# Patient Record
Sex: FEMALE | Race: WHITE | NOT HISPANIC OR LATINO | URBAN - METROPOLITAN AREA
[De-identification: names, ages, dates, MRNs, and addresses within clinical notes are randomized per-mention and may not be internally consistent; named-entity substitution may affect disease eponyms.]

---

## 2023-07-22 ENCOUNTER — INPATIENT (INPATIENT)
Facility: HOSPITAL | Age: 18
LOS: 3 days | Discharge: ANOTHER HOSPITAL | DRG: 917 | End: 2023-07-26
Attending: STUDENT IN AN ORGANIZED HEALTH CARE EDUCATION/TRAINING PROGRAM | Admitting: STUDENT IN AN ORGANIZED HEALTH CARE EDUCATION/TRAINING PROGRAM
Payer: MEDICAID

## 2023-07-22 VITALS
DIASTOLIC BLOOD PRESSURE: 80 MMHG | RESPIRATION RATE: 21 BRPM | HEIGHT: 64 IN | TEMPERATURE: 98 F | OXYGEN SATURATION: 96 % | SYSTOLIC BLOOD PRESSURE: 123 MMHG | HEART RATE: 157 BPM | WEIGHT: 160.06 LBS

## 2023-07-22 DIAGNOSIS — T45.0X2A POISONING BY ANTIALLERGIC AND ANTIEMETIC DRUGS, INTENTIONAL SELF-HARM, INITIAL ENCOUNTER: ICD-10-CM

## 2023-07-22 LAB
ALBUMIN SERPL ELPH-MCNC: 4.4 G/DL — SIGNIFICANT CHANGE UP (ref 3.5–5)
ALP SERPL-CCNC: 72 U/L — SIGNIFICANT CHANGE UP (ref 40–120)
ALT FLD-CCNC: 19 U/L DA — SIGNIFICANT CHANGE UP (ref 10–60)
AMPHET UR-MCNC: NEGATIVE — SIGNIFICANT CHANGE UP
ANION GAP SERPL CALC-SCNC: 13 MMOL/L — SIGNIFICANT CHANGE UP (ref 5–17)
APAP SERPL-MCNC: <2 UG/ML — LOW (ref 10–30)
APPEARANCE UR: CLEAR — SIGNIFICANT CHANGE UP
AST SERPL-CCNC: 16 U/L — SIGNIFICANT CHANGE UP (ref 10–40)
BACTERIA # UR AUTO: ABNORMAL /HPF
BARBITURATES UR SCN-MCNC: NEGATIVE — SIGNIFICANT CHANGE UP
BASOPHILS # BLD AUTO: 0.03 K/UL — SIGNIFICANT CHANGE UP (ref 0–0.2)
BASOPHILS NFR BLD AUTO: 0.3 % — SIGNIFICANT CHANGE UP (ref 0–2)
BENZODIAZ UR-MCNC: NEGATIVE — SIGNIFICANT CHANGE UP
BILIRUB SERPL-MCNC: 0.7 MG/DL — SIGNIFICANT CHANGE UP (ref 0.2–1.2)
BILIRUB UR-MCNC: NEGATIVE — SIGNIFICANT CHANGE UP
BUN SERPL-MCNC: 10 MG/DL — SIGNIFICANT CHANGE UP (ref 7–18)
CALCIUM SERPL-MCNC: 8.8 MG/DL — SIGNIFICANT CHANGE UP (ref 8.4–10.5)
CHLORIDE SERPL-SCNC: 105 MMOL/L — SIGNIFICANT CHANGE UP (ref 96–108)
CO2 SERPL-SCNC: 21 MMOL/L — LOW (ref 22–31)
COCAINE METAB.OTHER UR-MCNC: NEGATIVE — SIGNIFICANT CHANGE UP
COLOR SPEC: YELLOW — SIGNIFICANT CHANGE UP
CREAT SERPL-MCNC: 0.62 MG/DL — SIGNIFICANT CHANGE UP (ref 0.5–1.3)
DIFF PNL FLD: ABNORMAL
EGFR: 131 ML/MIN/1.73M2 — SIGNIFICANT CHANGE UP
EOSINOPHIL # BLD AUTO: 0.02 K/UL — SIGNIFICANT CHANGE UP (ref 0–0.5)
EOSINOPHIL NFR BLD AUTO: 0.2 % — SIGNIFICANT CHANGE UP (ref 0–6)
EPI CELLS # UR: ABNORMAL /HPF
ETHANOL SERPL-MCNC: <3 MG/DL — SIGNIFICANT CHANGE UP (ref 0–10)
GLUCOSE SERPL-MCNC: 99 MG/DL — SIGNIFICANT CHANGE UP (ref 70–99)
GLUCOSE UR QL: NEGATIVE — SIGNIFICANT CHANGE UP
HCG SERPL-ACNC: <1 MIU/ML — SIGNIFICANT CHANGE UP
HCT VFR BLD CALC: 37.1 % — SIGNIFICANT CHANGE UP (ref 34.5–45)
HGB BLD-MCNC: 12.6 G/DL — SIGNIFICANT CHANGE UP (ref 11.5–15.5)
IMM GRANULOCYTES NFR BLD AUTO: 0.2 % — SIGNIFICANT CHANGE UP (ref 0–0.9)
KETONES UR-MCNC: ABNORMAL
LEUKOCYTE ESTERASE UR-ACNC: NEGATIVE — SIGNIFICANT CHANGE UP
LYMPHOCYTES # BLD AUTO: 2.17 K/UL — SIGNIFICANT CHANGE UP (ref 1–3.3)
LYMPHOCYTES # BLD AUTO: 24.1 % — SIGNIFICANT CHANGE UP (ref 13–44)
MCHC RBC-ENTMCNC: 32.1 PG — SIGNIFICANT CHANGE UP (ref 27–34)
MCHC RBC-ENTMCNC: 34 GM/DL — SIGNIFICANT CHANGE UP (ref 32–36)
MCV RBC AUTO: 94.4 FL — SIGNIFICANT CHANGE UP (ref 80–100)
METHADONE UR-MCNC: NEGATIVE — SIGNIFICANT CHANGE UP
MONOCYTES # BLD AUTO: 0.58 K/UL — SIGNIFICANT CHANGE UP (ref 0–0.9)
MONOCYTES NFR BLD AUTO: 6.4 % — SIGNIFICANT CHANGE UP (ref 2–14)
NEUTROPHILS # BLD AUTO: 6.2 K/UL — SIGNIFICANT CHANGE UP (ref 1.8–7.4)
NEUTROPHILS NFR BLD AUTO: 68.8 % — SIGNIFICANT CHANGE UP (ref 43–77)
NITRITE UR-MCNC: NEGATIVE — SIGNIFICANT CHANGE UP
NRBC # BLD: 0 /100 WBCS — SIGNIFICANT CHANGE UP (ref 0–0)
OPIATES UR-MCNC: NEGATIVE — SIGNIFICANT CHANGE UP
PCP SPEC-MCNC: SIGNIFICANT CHANGE UP
PCP UR-MCNC: NEGATIVE — SIGNIFICANT CHANGE UP
PH UR: 6.5 — SIGNIFICANT CHANGE UP (ref 5–8)
PLATELET # BLD AUTO: 204 K/UL — SIGNIFICANT CHANGE UP (ref 150–400)
POTASSIUM SERPL-MCNC: 2.7 MMOL/L — CRITICAL LOW (ref 3.5–5.3)
POTASSIUM SERPL-SCNC: 2.7 MMOL/L — CRITICAL LOW (ref 3.5–5.3)
PROT SERPL-MCNC: 7.8 G/DL — SIGNIFICANT CHANGE UP (ref 6–8.3)
PROT UR-MCNC: 15 MG/DL
RBC # BLD: 3.93 M/UL — SIGNIFICANT CHANGE UP (ref 3.8–5.2)
RBC # FLD: 11.5 % — SIGNIFICANT CHANGE UP (ref 10.3–14.5)
RBC CASTS # UR COMP ASSIST: ABNORMAL /HPF (ref 0–2)
SALICYLATES SERPL-MCNC: <1.7 MG/DL — LOW (ref 2.8–20)
SODIUM SERPL-SCNC: 139 MMOL/L — SIGNIFICANT CHANGE UP (ref 135–145)
SP GR SPEC: 1.01 — SIGNIFICANT CHANGE UP (ref 1.01–1.02)
THC UR QL: NEGATIVE — SIGNIFICANT CHANGE UP
TSH SERPL-MCNC: 3.32 UU/ML — SIGNIFICANT CHANGE UP (ref 0.34–4.82)
UROBILINOGEN FLD QL: NEGATIVE — SIGNIFICANT CHANGE UP
WBC # BLD: 9.02 K/UL — SIGNIFICANT CHANGE UP (ref 3.8–10.5)
WBC # FLD AUTO: 9.02 K/UL — SIGNIFICANT CHANGE UP (ref 3.8–10.5)
WBC UR QL: SIGNIFICANT CHANGE UP /HPF (ref 0–5)

## 2023-07-22 PROCEDURE — 74018 RADEX ABDOMEN 1 VIEW: CPT | Mod: 26

## 2023-07-22 PROCEDURE — 99285 EMERGENCY DEPT VISIT HI MDM: CPT | Mod: 25

## 2023-07-22 PROCEDURE — 43753 TX GASTRO INTUB W/ASP: CPT

## 2023-07-22 PROCEDURE — 31500 INSERT EMERGENCY AIRWAY: CPT

## 2023-07-22 PROCEDURE — 71045 X-RAY EXAM CHEST 1 VIEW: CPT | Mod: 26

## 2023-07-22 PROCEDURE — 93010 ELECTROCARDIOGRAM REPORT: CPT

## 2023-07-22 RX ORDER — ENOXAPARIN SODIUM 100 MG/ML
40 INJECTION SUBCUTANEOUS EVERY 24 HOURS
Refills: 0 | Status: DISCONTINUED | OUTPATIENT
Start: 2023-07-22 | End: 2023-07-26

## 2023-07-22 RX ORDER — ACTIVATED CHARCOAL 25 G/120ML
100 SUSPENSION, ORAL (FINAL DOSE FORM) ORAL ONCE
Refills: 0 | Status: COMPLETED | OUTPATIENT
Start: 2023-07-22 | End: 2023-07-22

## 2023-07-22 RX ORDER — SODIUM CHLORIDE 9 MG/ML
1000 INJECTION INTRAMUSCULAR; INTRAVENOUS; SUBCUTANEOUS ONCE
Refills: 0 | Status: COMPLETED | OUTPATIENT
Start: 2023-07-22 | End: 2023-07-22

## 2023-07-22 RX ORDER — PHYSOSTIGMINE SALICYLATE 1 MG/ML
1 AMPUL (ML) INJECTION ONCE
Refills: 0 | Status: DISCONTINUED | OUTPATIENT
Start: 2023-07-22 | End: 2023-07-22

## 2023-07-22 RX ORDER — CHLORHEXIDINE GLUCONATE 213 G/1000ML
1 SOLUTION TOPICAL
Refills: 0 | Status: DISCONTINUED | OUTPATIENT
Start: 2023-07-22 | End: 2023-07-23

## 2023-07-22 RX ORDER — PROPOFOL 10 MG/ML
10 INJECTION, EMULSION INTRAVENOUS
Qty: 1000 | Refills: 0 | Status: DISCONTINUED | OUTPATIENT
Start: 2023-07-22 | End: 2023-07-23

## 2023-07-22 RX ORDER — PHYSOSTIGMINE SALICYLATE 1 MG/ML
2 AMPUL (ML) INJECTION ONCE
Refills: 0 | Status: DISCONTINUED | OUTPATIENT
Start: 2023-07-22 | End: 2023-07-22

## 2023-07-22 RX ADMIN — SODIUM CHLORIDE 1000 MILLILITER(S): 9 INJECTION INTRAMUSCULAR; INTRAVENOUS; SUBCUTANEOUS at 21:36

## 2023-07-22 RX ADMIN — Medication 100 GRAM(S): at 23:29

## 2023-07-22 RX ADMIN — Medication 2 MILLIGRAM(S): at 22:10

## 2023-07-22 RX ADMIN — SODIUM CHLORIDE 1000 MILLILITER(S): 9 INJECTION INTRAMUSCULAR; INTRAVENOUS; SUBCUTANEOUS at 22:40

## 2023-07-22 RX ADMIN — PROPOFOL 4.35 MICROGRAM(S)/KG/MIN: 10 INJECTION, EMULSION INTRAVENOUS at 23:29

## 2023-07-22 RX ADMIN — Medication 1 MILLIGRAM(S): at 22:10

## 2023-07-22 RX ADMIN — SODIUM CHLORIDE 1000 MILLILITER(S): 9 INJECTION INTRAMUSCULAR; INTRAVENOUS; SUBCUTANEOUS at 22:10

## 2023-07-22 RX ADMIN — SODIUM CHLORIDE 1000 MILLILITER(S): 9 INJECTION INTRAMUSCULAR; INTRAVENOUS; SUBCUTANEOUS at 22:00

## 2023-07-22 NOTE — ED PROCEDURE NOTE - CPROC ED GASTRIC INTUB DETAIL1
Patient Education     Rest.  Plenty of fluids to stay well hydrated and urinate more.  Add Vitamin C if you can tolerate it to acidify the urine or drink cranberry juice.  Take the antibiotic as prescribed.  Pyridium for the urgency and burning symptoms.  Any fever, abdominal pain, back pain, vomiting, change or worsening of symptoms or further concerns, return to the [facility] for evaluation.    You may be receiving a survey in the mail following your visit. Please take the time to complete this, as your feedback is very important to us! We strive to make your experience exceptional and your comments help us with that goal. We look forward to hearing from you!    Please call your doctor for follow up of this Urgent Care visit.  If anything changes or worsens or further concerns see your primary provider or return to the Urgent Care or seek care at an ER for further evaluation.   I hope our care team has provided you with excellent care and that you feel better soon.      Sincerely,  Sena Watkins PA-C                Bladder Infection, Female (Adult)    Normally, bacteria do not stay in the urine. But when they do, the urine can become infected. This is called a urinary tract infection (UTI). An infection can occur anywhere in the urinary tract, from the kidney to the bladder and urethra. The most common place for a UTI is in the bladder. This is called a bladder infection. This is one of the most common infections in women. Most bladder infections are easily treated. They are not serious unless the infection spreads up to the kidney.  The phrases \"bladder infection\", \"UTI,\" and \"cystitis,\" are often used to describe the same thing, but they are not always the same. Cystitis is an inflammation of the bladder. The most common cause of cystitis is an infection.  In summary:  · Infections in the urine are called UTIs.  · Cystitis is usually caused by a UTI.  · Not al UTIs and cases of cystitis are bladder  infections.  · Bladder infections are the most common type of cystitis.  Symptoms  The infection causes inflammation in the urethra and bladder, which causes many of the symptoms. The most common symptoms of a bladder infection are:  · Pain or burning when urinating  · Having to urinate more often than usual  · Urgent need to urinate  · Only a small amount of urine comes out  · Blood in urine  · Abdominal discomfort, usually in the lower abdomen, above the pubic bone  · Cloudy, strong, or bad smelling urine  · Urinary retention, being unable to urinate  · Unable to hold urine in (urinary incontinence)  · Fever  · Loss of appetite  · Confusion (in older adults)  Causes  Bladder infections are not contagious. You can't get one from someone else, from a toilet seat, or from sharing a bath.  The most common cause of bladder infections is bacteria from the bowels. The bacteria get onto the skin around the opening of the urethra. From there it can get into the urine and travel up to the bladder, causing inflammation and infection. This usually happens because of:  · Wiping improperly after urinating. Always wipe from front to back.  · Bowel incontinence  · Pregnancy  · Procedures such as having a catheter inserted  · Older age  · Not emptying your bladder (stagnated urine gives bacteria a chance to grow)  · Dehydration  · Constipation  · Sex  · Use of a diaphragm for birth control   Treatment  Bladder infections are diagnosed by a urine test. They are treated with antibiotics and usually clear up quickly without complications. Treatment helps prevent a more serious kidney infection.  Medicines  Medicines can help in the treatment of a bladder infection:  · Take antibiotics until they are used up, even if you feel better. It is important to finish them to make sure the infection has cleared.  · You can use acetaminophen or ibuprofen for pain, fever, or discomfort, unless another medicine was prescribed. You can also  alternate them, or use both together. They work differently and are a different class of medicines, so taking them together is not an overdose. If you have chronic liver or kidney disease, talk with your healthcare provider before using these medicines. Also talk with your provider if you've ever had a stomach ulcer or gastrointestinal bleeding, or are taking blood-thinner medicines.  · If you are given phenazopydridine to reduce burning with urination, it will cause your urine to become a bright orange color. This can stain clothing.  Care and prevention  These self-care steps can help prevent future infections:  · Drink plenty of fluids to prevent dehydration and flush out of the bladder. Do this unless you must restrict fluids for other health reasons, or your doctor told you not to.  · Proper cleaning after going to the bathroom is important. Wipe from front to back after using the toilet to prevent the spread of bacteria.  · Urinate more often. Don't try to hold urine in for a long time.  · Wear loose-fitting clothes and cotton underwear. Avoid tight-fitting pans.  · Improve your diet and prevent constipation. Eat more fresh fruit and vegetables, and fiber, and less junk and fatty foods.  · Avoid sex until your symptoms are gone.  · Avoid caffeine, alcohol, and spicy foods. These can irritate the bladder.  · Urinate right after intercourse to flush out the bladder.  · If you use birth control pills and have frequent bladder infections, discuss it with your doctor.  Follow-up care  Call your healthcare provider if all symptoms are not gone after 3 days of treatment. This is especially important if you have repeat infections.  If a culture was done, you will be told if your treatment needs to be changed. If directed, you can call to find out the results.  If X-rays were done, you will be told if the results will affect your treatment.  Call 911  Call emergency services if any of the following occur:  · Trouble  breathing  · Difficulty arousing or confusion  · Fainting or loss of consciousness  · Rapid heart rate  When to seek medical advice  Call your healthcare provider right away if any of these occur:  · Fever of 100.4ºF (38.0ºC) or higher, or as directed  · Symptoms are not better by the third day of treatment  · Back or belly (abdominal) pain that gets worse  · Repeated vomiting, or unable to keep medicine down  · Weakness or dizziness  · Vaginal discharge  · Pain, redness, or swelling in the outer vaginal area (labia)  © 8752-7682 [employer]. [address] [place] [id number]. All rights reserved. This information is not intended as a substitute for professional medical care. Always follow your healthcare professional's instructions.            The orogastric tube (see size above) was inserted via the anatomic location./Placement was confirmed by aspiration of gastric secretions./Bowel sounds present to 4 quadrants.

## 2023-07-22 NOTE — ED PROVIDER NOTE - CLINICAL SUMMARY MEDICAL DECISION MAKING FREE TEXT BOX
17 yo F with AMS suspected diphenydramine overdose. Plan for intubation, IV ativan, tox consult, gastric lavage, ICU admission

## 2023-07-22 NOTE — H&P ADULT - NSHPPHYSICALEXAM_GEN_ALL_CORE
PHYSICAL EXAM:  GENERAL: Intubated and sedated  HEAD:  Atraumatic, Normocephalic  EYES: EOMI, PERRLA, conjunctiva and sclera clear  NECK: Supple  CHEST/LUNG: Clear to auscultation bilaterally; No wheeze; No crackles; No accessory muscles used  HEART: Regular rate and rhythm; No murmurs; gallops or rubs   ABDOMEN: Soft, Nontender, Nondistended; Bowel sounds present; No guarding  EXTREMITIES:  2+ Peripheral Pulses, No cyanosis or edema  PSYCH: AAOx3  NEUROLOGY: unable to assess   SKIN: No rashes or lesions

## 2023-07-22 NOTE — H&P ADULT - HISTORY OF PRESENT ILLNESS
Patient is a 19 yo female with no PMH presents as per her sister Norma for altered mental status. As per patient's sister, patient was found by her mother around 7-8pm tonight passed put on the couch with a bottle of Diphenhydramine next to her. Mother notified Norma, who later called EMS. Sister reports that patient was normal through out the course of the day. As per sister, the bottle was full (had 600 pills) and then was emptied about  correction, when they found her. Patient was found urinating on herself. Patient's sister denies any prior episodes. Denies any psychiatric conditions or stressors in her life. Denies any head trauma or fall. Patient as per sister, is employed as a Physical therapist assistant, and goes to college. Denies illicit drug, ETOH or tobacco use.

## 2023-07-22 NOTE — ED PROVIDER NOTE - PROGRESS NOTE DETAILS
Pt sister dis Pt sister discovered missing pills later. Pt arrived as AMS from home. Has not signs to suggest trauma on exam. Pt with witnessed seizure by ED provider self resolved w/o meds.

## 2023-07-22 NOTE — H&P ADULT - ASSESSMENT
Assessment:   Patient is a 19 yo female with no PMH presents with altered mental status after ingesting unknown amounts of diphenhydramine. Upon evaluation in ED, patient afebrile, noted to be tachycardic to 157 bpm but hemodynamically stable. Patient noted to have 3 minute generalized tonic- clonic seizure in which she was unable to protect her airway and subsequently intubated and ativan was given. Gastric lavage was done showing pill fragments. Labs significant for no leukocytosis, potassium of 2.7 with normal renal function, LFTs noted to be normal. EKG showing...... CT head. xxxxxx Patient admitted to ICU for Acute Encephalopathy 2/2 Suspected Diphenhydramine overdose.     Plan:  Neuro:   #Acute encephalopathy:  - patient presenting with altered mental status after ingesting unknown amounts of diphenhydramine   - can be 2/2 to drug intoxication vs less likely CVA  - UTox noted to be negative, acetaminophen and salicylates negative   - called toxicology [phone] Case # [id number] for alternatives for physostigmine  (pharmacy does not have it)   - s/p ETT and jimenez in ED   - s/p gastric lavage in ED, retained pill fragments recovered  - s/p Activated charcoal in ED   - continue with propofol   - f/u ekg, cardiac enzymes     # Seizures   - patient noted with 2-3 minute one time episode of seizure, unable to protect airway subsequently intubated   - given total 3mg Ativan in ED  - can be 2/2 drug overdose   - Utox noted to be negative, UA noted to be negative for infection   - pharmacology f/u   - f/u ck, lactate, cardiac enzymes   - f/u blood cultures r/o infectious etiologies   - Neuro checks   - f/u EEG  - Neuro consult in AM     Cardiovascular:  # Hypokalemia   - noted to be 2.7   - can be 2/2 s/p gastric lavage   - KCl 40meq x2via OG tube   - f/u EKG and cardiac enzymes   - repeat CMP     Pulmonary:   - No acute issues     Infectious Diseases:  - No acute issues     Gastrointestinal:  - NPO     Renal:  - No acute issues     Heme/onc:   - No acute issues     Endo:   - No acute issues     Skin/ catheter:   - Jimenez catheter placed in ED  - Two peripheral line IVs in place     Prophylaxis:   - DVT prophylaxis     Dispo: ICU  Assessment:   Patient is a 19 yo female with no PMH presents with altered mental status after ingesting unknown amounts of diphenhydramine. Upon evaluation in ED, patient afebrile, noted to be tachycardic to 157 bpm but hemodynamically stable. Patient noted to have 3 minute generalized tonic- clonic seizure in which she was unable to protect her airway and subsequently intubated and ativan was given. Gastric lavage was done showing pill fragments. Labs significant for no leukocytosis, potassium of 2.7 with normal renal function, LFTs noted to be normal. EKG showing...... CT head. xxxxxx Patient admitted to ICU for Acute Encephalopathy 2/2 Suspected Diphenhydramine overdose.     Plan:  Neuro:   #Acute encephalopathy:  - patient presenting with altered mental status after ingesting unknown amounts of diphenhydramine   - can be 2/2 to drug intoxication vs less likely CVA  - UTox noted to be negative, acetaminophen and salicylates negative   - called toxicology [phone] Case # [id number] for alternatives for physostigmine  (pharmacy does not have it)   - s/p ETT and jimenez in ED   - s/p gastric lavage in ED, retained pill fragments recovered  - s/p Activated charcoal in ED   - continue with propofol   - f/u ekg, cardiac enzymes   - f/u CT head  - f/u CT chest and  abdomen pelvis     # Seizures   - patient noted with 2-3 minute one time episode of seizure, unable to protect airway subsequently intubated   - given total 3mg Ativan in ED  - can be 2/2 drug overdose   - Utox noted to be negative, UA noted to be negative for infection   - pharmacology f/u   - f/u ck, lactate, cardiac enzymes   - f/u blood cultures r/o infectious etiologies   - Neuro checks   - continue with ativan as needed   - f/u EEG  - Neuro consult in AM     Cardiovascular:  # Hypokalemia   - noted to be 2.7   - can be 2/2 s/p gastric lavage   - KCl 40meq x2via OG tube   - f/u EKG and cardiac enzymes   - repeat CMP     Pulmonary:   - No acute issues     Infectious Diseases:  - No acute issues     Gastrointestinal:  - NPO   - f/u ct abdomen and pelvis     Renal:  - No acute issues     Heme/onc:   - No acute issues     Endo:   - No acute issues     Skin/ catheter:   - Jimenez catheter placed in ED  - Two peripheral line IVs in place     Prophylaxis:   - DVT prophylaxis     Dispo: ICU  Assessment:   Patient is a 19 yo female with no PMH presents with altered mental status after ingesting unknown amounts of diphenhydramine. Upon evaluation in ED, patient afebrile, noted to be tachycardic to 157 bpm but hemodynamically stable. Patient noted to have 3 minute generalized tonic- clonic seizure in which she was unable to protect her airway and subsequently intubated and ativan was given. Gastric lavage was done showing pill fragments. Labs significant for no leukocytosis, potassium of 2.7 with normal renal function, LFTs noted to be normal, negative cardiac enzymes. EKG showing sinus tachycardia to 135pm with t wave inversions in the inferior leads (unknown baseline), QRS noted to be 88ms and normal QTC at 405ms.  CT head. xxxxxx Patient admitted to ICU for Acute Encephalopathy 2/2 Suspected Diphenhydramine overdose.     Plan:  Neuro:   #Acute encephalopathy:  - patient presenting with altered mental status after ingesting unknown amounts of diphenhydramine   - can be 2/2 to drug intoxication vs less likely CVA  - UTox noted to be negative, acetaminophen and salicylates negative   - EKG noted Sinus tachyardia to 135bpm with t wave inversions in the inferior leads normal QRS and QT   - called toxicology [phone] Case # [id number] for alternatives for physostigmine  (pharmacy does not have it), recommended to hold any further antidotes for diphyenhydramine toxicity, but recommending supportive therapy and consider rivastigmine patch   - s/p ETT and jimenez in ED   - s/p gastric lavage in ED, retained pill fragments recovered  - s/p Activated charcoal in ED   - continue with propofol   - f/u CT head  - f/u CT chest and  abdomen pelvis     # Seizures   - patient noted with 2-3 minute one time episode of seizure, unable to protect airway subsequently intubated   - given total 3mg Ativan in ED  - can be 2/2 drug overdose   - Utox noted to be negative, UA noted to be negative for infection   - negative cardiac enzymes, lactate noted to be 2.4   - f/u blood cultures r/o infectious etiologies   - Neuro checks   - continue with ativan as needed   - f/u EEG  - Neuro consult in AM     Cardiovascular:  # Hypokalemia   - noted to be 2.7   - can be 2/2 s/p gastric lavage/ GI losses   - KCl riders 10meq x3 with LR fluids   - EKG noted as above with negative cardiac enzymes   - repeat CMP     Pulmonary:   - No acute issues     Infectious Diseases:   # Elevated lactate  - noted to be 2.4   - can be 2/2 seizures   - afebrile with no leukocytosis   - IVF hydration   - trend lactate  - f/u ct abdomen   - f/u blood cultures     Gastrointestinal:  - NPO   - f/u ct abdomen and pelvis     Renal:  - No acute issues     Heme/onc:   - No acute issues     Endo:   - No acute issues     Skin/ catheter:   - Jimenez catheter placed in ED  - Two peripheral line IVs in place     Prophylaxis:   - DVT prophylaxis     Dispo: ICU  Assessment:   Patient is a 19 yo female with no PMH presents with altered mental status after ingesting unknown amounts of diphenhydramine. Upon evaluation in ED, patient afebrile, noted to be tachycardic to 157 bpm but hemodynamically stable. Patient noted to have 3 minute generalized tonic- clonic seizure in which she was unable to protect her airway and subsequently intubated and ativan was given. Gastric lavage was done showing pill fragments. Labs significant for no leukocytosis, potassium of 2.7 with normal renal function, LFTs noted to be normal, negative cardiac enzymes. EKG showing sinus tachycardia to 135pm with t wave inversions in the inferior leads (unknown baseline), QRS noted to be 88ms and normal QTC at 405ms.  CT head pending.  Patient admitted to ICU for Acute Encephalopathy 2/2 Suspected Diphenhydramine overdose.     Plan:  Neuro:   #Acute encephalopathy:  - patient presenting with altered mental status after ingesting unknown amounts of diphenhydramine   - can be 2/2 to drug intoxication vs less likely CVA  - UTox noted to be negative, acetaminophen and salicylates negative   - EKG noted Sinus tachyardia to 135bpm with t wave inversions in the inferior leads normal QRS and QT   - called toxicology [phone] Case # [id number] for alternatives for physostigmine  (pharmacy does not have it), recommended to hold any further antidotes for diphyenhydramine toxicity, but recommending supportive therapy and consider rivastigmine patch   - s/p ETT and jimenez in ED   - s/p gastric lavage in ED, retained pill fragments recovered  - s/p Activated charcoal in ED   - continue with propofol   - f/u CT head  - f/u CT chest and  abdomen pelvis     # Seizures   - patient noted with 2-3 minute one time episode of seizure, unable to protect airway subsequently intubated   - given total 3mg Ativan in ED  - can be 2/2 drug overdose   - Utox noted to be negative, UA noted to be negative for infection   - negative cardiac enzymes, lactate noted to be 2.4   - f/u blood cultures r/o infectious etiologies   - Neuro checks   - continue with ativan as needed   - f/u EEG  - Neuro consult in AM     Cardiovascular:  # Hypokalemia   - noted to be 2.7   - can be 2/2 s/p gastric lavage/ GI losses   - KCl riders 10meq x3 with LR fluids   - EKG noted as above with negative cardiac enzymes   - repeat CMP     # Prolonged QT interval  - repeat EKG showing NSR with no T wave inversions  - QT showing 484 ms  - can be 2/2 hypokalemia  - avoid QT prolonging agents     Pulmonary:   - No acute issues     Infectious Diseases:   # Elevated lactate  - noted to be 2.4   - can be 2/2 seizures   - afebrile with no leukocytosis   - IVF hydration   - trend lactate  - f/u ct abdomen   - f/u blood cultures     Gastrointestinal:  - NPO   - f/u ct abdomen and pelvis     Renal:  - No acute issues     Heme/onc:   - No acute issues     Endo:   - No acute issues     Skin/ catheter:   - Jimenez catheter placed in ED  - Two peripheral line IVs in place     Prophylaxis:   - DVT prophylaxis     Dispo: ICU

## 2023-07-22 NOTE — ED PROVIDER NOTE - OBJECTIVE STATEMENT
18 F no PMH p/w AMS noted by mother around 8PM. Pt was found on sofa with pink colored emesis and was found altered. Last seen by sister 7-730PM today. Family noted emesis and noted empty bottle of benadryl which contained 600 pills and was previously half full. Pt has no medical history and no history of depression or suicide attempt.

## 2023-07-22 NOTE — ED PROCEDURE NOTE - CPROC ED POST RADIOGRAPHY1
Labs reviewed by Florencia Corbett NP. Patient informed that RPR and HIV are negative, hemoglobin A1C is good, and CBC and CMP are all normal. Informed that the Calcitriol is still in progress and we will let him know the result when it is available. Patient verbalized understanding.  
----- Message from Shara Power sent at 2/5/2018  3:03 PM CST -----  Pt at [phone]//states he had lab work done a few days ago//is calling for the results//please call//rod/rohit  
post-procedure radiography performed

## 2023-07-22 NOTE — H&P ADULT - NSHPSOCIALHISTORY_GEN_ALL_CORE
Patient lives with mother and daughter. Patient's sister denies any illicit drug, ETOH or tobacco use.

## 2023-07-22 NOTE — ED PROVIDER NOTE - NOTES
intubation, EKG, benzo for agitation, monitor for QRS widening, jimenez catheter  physostigmine not available in most facilities

## 2023-07-22 NOTE — ED ADULT NURSE NOTE - OBJECTIVE STATEMENT
pt arrives with sister c/o altered mental status due to taking unknown amount of medications at home approx around 4pm today. laceration left wrist (2 lac, superficial, no active bleeding noted) - per sister, wound is new. sister unsure if pt has been depressed lately. EKG done and seen by Dr. Ramos, bgl done.     pt had 1 witnessed seizure approx 30secs, placed on NRB at 15lpm. transferred pt to another bed pt arrives with sister c/o altered mental status due to taking unknown amount of medications at home approx around 4pm today. laceration left wrist (2 lac, superficial, no active bleeding noted) - per sister, wound is new. sister unsure if pt has been depressed lately. EKG done and seen by Dr. Ramos, bgl done.     pt had 1 witnessed seizure approx 30secs, placed on NRB at 15lpm. transferred pt to another bed. RT at bedside , etomidate 20 IV given per verbal order by Dr. canales. intubated by Dr. canales ET 7 level 23 at lip. OGfr.[id number] inserted by provider and attached to suction, gastric lavage done by provider.     jimenez fr16 inserted, urine sent.

## 2023-07-22 NOTE — ED ADULT NURSE NOTE - NS ED NURSE TRANSPORT WITH
Cardiac Monitor/Defib/ACLS/Rescue Kit/O2/BVM/IV pump/pulse ox/ventilator Nurse, MD & RT/Cardiac Monitor/Defib/ACLS/Rescue Kit/O2/BVM/IV pump/pulse ox/ventilator

## 2023-07-22 NOTE — ED ADULT NURSE NOTE - NSFALLRISKINTERV_ED_ALL_ED
Assistance OOB with selected safe patient handling equipment if applicable/Assistance with ambulation/Communicate fall risk and risk factors to all staff, patient, and family/Monitor gait and stability/Monitor for mental status changes and reorient to person, place, and time, as needed/Provide visual cue: yellow wristband, yellow gown, etc/Reinforce activity limits and safety measures with patient and family/Toileting schedule using arm’s reach rule for commode and bathroom/Use of alarms - bed, stretcher, chair and/or video monitoring/Call bell, personal items and telephone in reach/Instruct patient to call for assistance before getting out of bed/chair/stretcher/Non-slip footwear applied when patient is off stretcher/[place] to call system/Physically safe environment - no spills, clutter or unnecessary equipment/Purposeful Proactive Rounding/Room/bathroom lighting operational, light cord in reach

## 2023-07-22 NOTE — ED PROCEDURE NOTE - CPROC ED TIME OUT STATEMENT1
“Patient's name, , procedure and correct site were confirmed during the [place] Timeout.”
“Patient's name, , procedure and correct site were confirmed during the [place] Timeout.”

## 2023-07-23 LAB
ALBUMIN SERPL ELPH-MCNC: 3.5 G/DL — SIGNIFICANT CHANGE UP (ref 3.5–5)
ALP SERPL-CCNC: 57 U/L — SIGNIFICANT CHANGE UP (ref 40–120)
ALT FLD-CCNC: 17 U/L DA — SIGNIFICANT CHANGE UP (ref 10–60)
ANION GAP SERPL CALC-SCNC: 10 MMOL/L — SIGNIFICANT CHANGE UP (ref 5–17)
ANION GAP SERPL CALC-SCNC: 11 MMOL/L — SIGNIFICANT CHANGE UP (ref 5–17)
AST SERPL-CCNC: 14 U/L — SIGNIFICANT CHANGE UP (ref 10–40)
BASE EXCESS BLDA CALC-SCNC: -0.2 MMOL/L — SIGNIFICANT CHANGE UP (ref -2–3)
BILIRUB SERPL-MCNC: 0.6 MG/DL — SIGNIFICANT CHANGE UP (ref 0.2–1.2)
BLOOD GAS COMMENTS ARTERIAL: SIGNIFICANT CHANGE UP
BUN SERPL-MCNC: 6 MG/DL — LOW (ref 7–18)
BUN SERPL-MCNC: 7 MG/DL — SIGNIFICANT CHANGE UP (ref 7–18)
CALCIUM SERPL-MCNC: 7.7 MG/DL — LOW (ref 8.4–10.5)
CALCIUM SERPL-MCNC: 8.8 MG/DL — SIGNIFICANT CHANGE UP (ref 8.4–10.5)
CHLORIDE SERPL-SCNC: 109 MMOL/L — HIGH (ref 96–108)
CHLORIDE SERPL-SCNC: 110 MMOL/L — HIGH (ref 96–108)
CK SERPL-CCNC: 66 U/L — SIGNIFICANT CHANGE UP (ref 21–215)
CO2 SERPL-SCNC: 21 MMOL/L — LOW (ref 22–31)
CO2 SERPL-SCNC: 24 MMOL/L — SIGNIFICANT CHANGE UP (ref 22–31)
CREAT SERPL-MCNC: 0.5 MG/DL — SIGNIFICANT CHANGE UP (ref 0.5–1.3)
CREAT SERPL-MCNC: 0.62 MG/DL — SIGNIFICANT CHANGE UP (ref 0.5–1.3)
EGFR: 131 ML/MIN/1.73M2 — SIGNIFICANT CHANGE UP
EGFR: 138 ML/MIN/1.73M2 — SIGNIFICANT CHANGE UP
GLUCOSE BLDC GLUCOMTR-MCNC: 78 MG/DL — SIGNIFICANT CHANGE UP (ref 70–99)
GLUCOSE BLDC GLUCOMTR-MCNC: 99 MG/DL — SIGNIFICANT CHANGE UP (ref 70–99)
GLUCOSE SERPL-MCNC: 84 MG/DL — SIGNIFICANT CHANGE UP (ref 70–99)
GLUCOSE SERPL-MCNC: 97 MG/DL — SIGNIFICANT CHANGE UP (ref 70–99)
HCO3 BLDA-SCNC: 22 MMOL/L — SIGNIFICANT CHANGE UP (ref 21–28)
HCT VFR BLD CALC: 33 % — LOW (ref 34.5–45)
HGB BLD-MCNC: 11.4 G/DL — LOW (ref 11.5–15.5)
HOROWITZ INDEX BLDA+IHG-RTO: 40 — SIGNIFICANT CHANGE UP
LACTATE SERPL-SCNC: 1.9 MMOL/L — SIGNIFICANT CHANGE UP (ref 0.7–2)
LACTATE SERPL-SCNC: 2.4 MMOL/L — HIGH (ref 0.7–2)
MAGNESIUM SERPL-MCNC: 1.7 MG/DL — SIGNIFICANT CHANGE UP (ref 1.6–2.6)
MAGNESIUM SERPL-MCNC: 2.1 MG/DL — SIGNIFICANT CHANGE UP (ref 1.6–2.6)
MCHC RBC-ENTMCNC: 32.5 PG — SIGNIFICANT CHANGE UP (ref 27–34)
MCHC RBC-ENTMCNC: 34.5 GM/DL — SIGNIFICANT CHANGE UP (ref 32–36)
MCV RBC AUTO: 94 FL — SIGNIFICANT CHANGE UP (ref 80–100)
NRBC # BLD: 0 /100 WBCS — SIGNIFICANT CHANGE UP (ref 0–0)
PCO2 BLDA: 29 MMHG — LOW (ref 32–35)
PH BLDA: 7.49 — HIGH (ref 7.35–7.45)
PHOSPHATE SERPL-MCNC: 2.7 MG/DL — SIGNIFICANT CHANGE UP (ref 2.5–4.5)
PHOSPHATE SERPL-MCNC: 3.9 MG/DL — SIGNIFICANT CHANGE UP (ref 2.5–4.5)
PLATELET # BLD AUTO: 179 K/UL — SIGNIFICANT CHANGE UP (ref 150–400)
PO2 BLDA: 80 MMHG — LOW (ref 83–108)
POTASSIUM SERPL-MCNC: 3.4 MMOL/L — LOW (ref 3.5–5.3)
POTASSIUM SERPL-MCNC: 3.8 MMOL/L — SIGNIFICANT CHANGE UP (ref 3.5–5.3)
POTASSIUM SERPL-SCNC: 3.4 MMOL/L — LOW (ref 3.5–5.3)
POTASSIUM SERPL-SCNC: 3.8 MMOL/L — SIGNIFICANT CHANGE UP (ref 3.5–5.3)
PROT SERPL-MCNC: 6.3 G/DL — SIGNIFICANT CHANGE UP (ref 6–8.3)
RBC # BLD: 3.51 M/UL — LOW (ref 3.8–5.2)
RBC # FLD: 11.5 % — SIGNIFICANT CHANGE UP (ref 10.3–14.5)
SAO2 % BLDA: 97 % — SIGNIFICANT CHANGE UP
SODIUM SERPL-SCNC: 141 MMOL/L — SIGNIFICANT CHANGE UP (ref 135–145)
SODIUM SERPL-SCNC: 144 MMOL/L — SIGNIFICANT CHANGE UP (ref 135–145)
TRIGL SERPL-MCNC: 141 MG/DL — SIGNIFICANT CHANGE UP
TROPONIN I, HIGH SENSITIVITY RESULT: 6.4 NG/L — SIGNIFICANT CHANGE UP
WBC # BLD: 14.75 K/UL — HIGH (ref 3.8–10.5)
WBC # FLD AUTO: 14.75 K/UL — HIGH (ref 3.8–10.5)

## 2023-07-23 PROCEDURE — 95816 EEG AWAKE AND DROWSY: CPT | Mod: 26

## 2023-07-23 PROCEDURE — 99291 CRITICAL CARE FIRST HOUR: CPT

## 2023-07-23 PROCEDURE — 71250 CT THORAX DX C-: CPT | Mod: 26

## 2023-07-23 PROCEDURE — 70450 CT HEAD/BRAIN W/O DYE: CPT | Mod: 26

## 2023-07-23 PROCEDURE — 74176 CT ABD & PELVIS W/O CONTRAST: CPT | Mod: 26

## 2023-07-23 PROCEDURE — 93010 ELECTROCARDIOGRAM REPORT: CPT | Mod: 76

## 2023-07-23 PROCEDURE — 71045 X-RAY EXAM CHEST 1 VIEW: CPT | Mod: 26

## 2023-07-23 RX ORDER — POTASSIUM CHLORIDE 20 MEQ
10 PACKET (EA) ORAL
Refills: 0 | Status: COMPLETED | OUTPATIENT
Start: 2023-07-23 | End: 2023-07-23

## 2023-07-23 RX ORDER — CHLORHEXIDINE GLUCONATE 213 G/1000ML
15 SOLUTION TOPICAL EVERY 12 HOURS
Refills: 0 | Status: DISCONTINUED | OUTPATIENT
Start: 2023-07-23 | End: 2023-07-24

## 2023-07-23 RX ORDER — PANTOPRAZOLE SODIUM 20 MG/1
40 TABLET, DELAYED RELEASE ORAL DAILY
Refills: 0 | Status: DISCONTINUED | OUTPATIENT
Start: 2023-07-23 | End: 2023-07-25

## 2023-07-23 RX ORDER — PROPOFOL 10 MG/ML
45 INJECTION, EMULSION INTRAVENOUS
Qty: 1000 | Refills: 0 | Status: DISCONTINUED | OUTPATIENT
Start: 2023-07-23 | End: 2023-07-24

## 2023-07-23 RX ORDER — POTASSIUM CHLORIDE 20 MEQ
40 PACKET (EA) ORAL
Refills: 0 | Status: DISCONTINUED | OUTPATIENT
Start: 2023-07-23 | End: 2023-07-23

## 2023-07-23 RX ORDER — CHLORHEXIDINE GLUCONATE 213 G/1000ML
1 SOLUTION TOPICAL
Refills: 0 | Status: DISCONTINUED | OUTPATIENT
Start: 2023-07-23 | End: 2023-07-24

## 2023-07-23 RX ORDER — SODIUM CHLORIDE 9 MG/ML
1000 INJECTION, SOLUTION INTRAVENOUS
Refills: 0 | Status: DISCONTINUED | OUTPATIENT
Start: 2023-07-23 | End: 2023-07-24

## 2023-07-23 RX ADMIN — PROPOFOL 16.8 MICROGRAM(S)/KG/MIN: 10 INJECTION, EMULSION INTRAVENOUS at 09:49

## 2023-07-23 RX ADMIN — CHLORHEXIDINE GLUCONATE 1 APPLICATION(S): 213 SOLUTION TOPICAL at 12:51

## 2023-07-23 RX ADMIN — CHLORHEXIDINE GLUCONATE 15 MILLILITER(S): 213 SOLUTION TOPICAL at 17:11

## 2023-07-23 RX ADMIN — Medication 100 MILLIEQUIVALENT(S): at 12:50

## 2023-07-23 RX ADMIN — Medication 100 MILLIEQUIVALENT(S): at 06:40

## 2023-07-23 RX ADMIN — ENOXAPARIN SODIUM 40 MILLIGRAM(S): 100 INJECTION SUBCUTANEOUS at 05:54

## 2023-07-23 RX ADMIN — Medication 100 MILLIEQUIVALENT(S): at 13:54

## 2023-07-23 RX ADMIN — Medication 100 MILLIEQUIVALENT(S): at 01:57

## 2023-07-23 RX ADMIN — Medication 100 MILLIEQUIVALENT(S): at 14:42

## 2023-07-23 RX ADMIN — PROPOFOL 16.8 MICROGRAM(S)/KG/MIN: 10 INJECTION, EMULSION INTRAVENOUS at 15:38

## 2023-07-23 RX ADMIN — PANTOPRAZOLE SODIUM 40 MILLIGRAM(S): 20 TABLET, DELAYED RELEASE ORAL at 11:12

## 2023-07-23 RX ADMIN — CHLORHEXIDINE GLUCONATE 15 MILLILITER(S): 213 SOLUTION TOPICAL at 05:54

## 2023-07-23 RX ADMIN — Medication 100 MILLIEQUIVALENT(S): at 08:18

## 2023-07-23 RX ADMIN — SODIUM CHLORIDE 100 MILLILITER(S): 9 INJECTION, SOLUTION INTRAVENOUS at 06:40

## 2023-07-23 RX ADMIN — PROPOFOL 16.8 MICROGRAM(S)/KG/MIN: 10 INJECTION, EMULSION INTRAVENOUS at 20:52

## 2023-07-23 RX ADMIN — Medication 100 MILLIEQUIVALENT(S): at 06:41

## 2023-07-23 RX ADMIN — PROPOFOL 16.8 MICROGRAM(S)/KG/MIN: 10 INJECTION, EMULSION INTRAVENOUS at 05:54

## 2023-07-23 RX ADMIN — SODIUM CHLORIDE 100 MILLILITER(S): 9 INJECTION, SOLUTION INTRAVENOUS at 01:59

## 2023-07-23 RX ADMIN — Medication 100 MILLIEQUIVALENT(S): at 02:58

## 2023-07-23 RX ADMIN — Medication 100 MILLIEQUIVALENT(S): at 02:59

## 2023-07-23 NOTE — PROGRESS NOTE ADULT - ASSESSMENT
Assessment:   Patient is a 17 yo female with no PMH presents with altered mental status after ingesting unknown amounts of diphenhydramine. Upon evaluation in ED, patient afebrile, noted to be tachycardic to 157 bpm but hemodynamically stable. Patient noted to have 3 minute generalized tonic- clonic seizure in which she was unable to protect her airway and subsequently intubated and ativan was given. Gastric lavage was done showing pill fragments. Labs significant for no leukocytosis, potassium of 2.7 with normal renal function, LFTs noted to be normal, negative cardiac enzymes. EKG showing sinus tachycardia to 135pm with t wave inversions in the inferior leads (unknown baseline), QRS noted to be 88ms and normal QTC at 405ms.  CT head. xxxxxx Patient admitted to ICU for Acute Encephalopathy 2/2 Suspected Diphenhydramine overdose.     Plan:  Neuro:   #Acute encephalopathy:  - patient presenting with altered mental status after ingesting unknown amounts of diphenhydramine   - can be 2/2 to drug intoxication vs less likely CVA  - UTox noted to be negative, acetaminophen and salicylates negative   - EKG noted Sinus tachyardia to 135bpm with t wave inversions in the inferior leads normal QRS and QT   - called toxicology [phone] Case # [id number] for alternatives for physostigmine  (pharmacy does not have it), recommended to hold any further antidotes for diphyenhydramine toxicity, but recommending supportive therapy and consider rivastigmine patch   - s/p ETT and jimenez in ED   - s/p gastric lavage in ED, retained pill fragments recovered  - s/p Activated charcoal in ED   - continue with propofol   - f/u CT head  - f/u CT chest and  abdomen pelvis     # Seizures   - patient noted with 2-3 minute one time episode of seizure, unable to protect airway subsequently intubated   - given total 3mg Ativan in ED  - can be 2/2 drug overdose   - Utox noted to be negative, UA noted to be negative for infection   - negative cardiac enzymes, lactate noted to be 2.4   - f/u blood cultures r/o infectious etiologies   - Neuro checks   - continue with ativan as needed   - f/u EEG  - Neuro consult in AM     Cardiovascular:  # Hypokalemia   - noted to be 2.7   - can be 2/2 s/p gastric lavage/ GI losses   - KCl riders 10meq x3 with LR fluids   - EKG noted as above with negative cardiac enzymes   - repeat CMP     Pulmonary:   - No acute issues     Infectious Diseases:   # Elevated lactate  - noted to be 2.4   - can be 2/2 seizures   - afebrile with no leukocytosis   - IVF hydration   - trend lactate  - f/u ct abdomen   - f/u blood cultures     Gastrointestinal:  - NPO   - f/u ct abdomen and pelvis     Renal:  - No acute issues     Heme/onc:   - No acute issues     Endo:   - No acute issues     Skin/ catheter:   - Jimenez catheter placed in ED  - Two peripheral line IVs in place     Prophylaxis:   - DVT prophylaxis     Dispo: ICU  Patient is a 19 yo female with no PMH presents with altered mental status after ingesting unknown amounts of diphenhydramine. Upon evaluation in ED, patient afebrile, noted to be tachycardic to 157 bpm but hemodynamically stable. Patient noted to have 3 minute generalized tonic- clonic seizure in which she was unable to protect her airway and subsequently intubated and ativan was given. Gastric lavage was done showing pill fragments. Labs significant for no leukocytosis, potassium of 2.7 with normal renal function, LFTs noted to be normal, negative cardiac enzymes. EKG showing sinus tachycardia to 135pm with t wave inversions in the inferior leads (unknown baseline), and a QTc of 184. CT head negative for acute injury. Patient admitted to ICU for Acute Encephalopathy 2/2 Suspected Diphenhydramine overdose.     Plan:  Neuro:   #Acute encephalopathy  - Likely Toxic Metabolic Encephalopthy  - patient presenting with altered mental status after ingesting unknown amounts of diphenhydramine   - UTox noted to be negative, acetaminophen and salicylates negative   - EKG noted Sinus tachyardia to 135bpm with t wave inversions in the inferior leads normal QRS and QTc currently normal   - Toxicology following, Spoke to toxicology fellow, Dr. Oreilly  - s/p ETT and barbara in ED   - s/p gastric lavage in ED, retained pill fragments recovered  - s/p Activated charcoal in ED   - c/w with propofol   - CT head: No signs of acute brain infarct or hemorrhage    # Seizures   - Patient noted with 2-3 minute one time episode of seizure, unable to protect airway subsequently intubated   - Given total 3mg Ativan in ED  - Likely 2/2 drug overdose   - Utox noted to be negative, UA noted to be negative for infection   - Negative cardiac enzymes, lactate noted to be 2.4   - f/u blood cultures r/o infectious etiologies   - Neuro checks   - c/w ativan as needed   - EEG: No epileptiform activity  - Neuro consult in AM     Cardiovascular:  # Hypokalemia   - Noted to be 2.7 > 3.4 > 3.8  - Can be 2/2 s/p gastric lavage/ GI losses   - KCl riders 10meq x3 with LR fluids   - EKG noted as above with negative cardiac enzymes   - Repeat CMP and replete electrolyte to keep K>4 and Mg>2     Pulmonary:   - No acute issues     Infectious Diseases:   # Elevated lactate  - Noted to be 2.4   - Possibly 2/2 seizures   - Afebrile with no leukocytosis   - IVF hydration   - Trend lactate  - f/u blood cultures     Gastrointestinal:  - NPO   - f/u ct abdomen and pelvis     Renal:  - No acute issues     Heme/onc:   - No acute issues     Endo:   - No acute issues     Skin/ catheter:   - Sierra catheter placed in ED  - Two peripheral line IVs in place     Prophylaxis:   - DVT prophylaxis     Dispo: ICU

## 2023-07-23 NOTE — EEG REPORT - NS EEG TEXT BOX
[facility]   COMPREHENSIVE EPILEPSY CENTER   REPORT OF ROUTINE EEG W/ Video     [facility]: [address] , [address], [place] [address], Ph#: [phone]  [facility]:  [address] [place] [id number], Ph#: [phone]  [facility]: [address] [place] [address], Ph#: [phone]    Patient Name: VERNON BAILEY  Age and : 18y ([birth year])  MRN #: [id number]  Location: [place] [address]  Referring Physician: Delonte Pittman    Study Date: 23    _____________________________________________________________  TECHNICAL INFORMATION    Placement and Labeling of Electrodes:  The EEG was performed utilizing 20 channels referential EEG connections (coronal over temporal over parasagittal montage) using all standard 10-20 electrode placements with EKG.  Recording was at a sampling rate of 256 samples per second per channel.  Time synchronized digital video recording was done simultaneously with EEG recording.  A low light infrared camera was used for low light recording.  Aram and seizure detection algorithms were utilized.    _____________________________________________________________  HISTORY    Patient is a 18y old  Female who presents with a chief complaint of Acute encephalopathy 2/2 Suspected drug overdose (2023 05:25)      PERTINENT MEDICATION:  MEDICATIONS  (STANDING):  chlorhexidine 0.12% Liquid 15 milliLiter(s) Oral Mucosa every 12 hours  chlorhexidine 2% Cloths 1 Application(s) Topical <User Schedule>  enoxaparin Injectable 40 milliGRAM(s) SubCutaneous every 24 hours  lactated ringers. 1000 milliLiter(s) (100 mL/Hr) IV Continuous <Continuous>  pantoprazole  Injectable 40 milliGRAM(s) IV Push daily  propofol Infusion 45 MICROgram(s)/kG/Min (16.8 mL/Hr) IV Continuous <Continuous>    _____________________________________________________________  STUDY INTERPRETATION    Findings: The background was continuous, spontaneously variable and reactive.  No posterior dominant rhythm seen.  Background predominantly consisted of theta, delta and faster activities.    Focal Slowing:   None were present.    Sleep Background:  Drowsiness and stage II sleep transients were not recorded.    Other Non-Epileptiform Findings:  Diffuse excess beta activity.    Interictal Epileptiform Activity:   None were present.    Events:  Clinical events: None recorded.  Seizures: None recorded.    Activation Procedures:   Hyperventilation was not performed.    Photic stimulation was not performed.     Artifacts:  Intermittent myogenic and movement artifacts were noted.    _____________________________________________________________  EEG SUMMARY/CLASSIFICATION    Abnormal EEG   - Moderate generalized slowing.  - Diffuse beta activity.    _____________________________________________________________  EEG IMPRESSION/CLINICAL CORRELATE     Abnormal EEG study.  Moderate nonspecific diffuse or multifocal cerebral dysfunction.   No epileptiform pattern or seizure seen.  Diffuse excess beta activity may be seen with medication use such as benzodiazepines or barbiturates.    Johnie Ruvalcaba MD  EEG/Epilepsy Attending

## 2023-07-23 NOTE — CONSULT NOTE ADULT - SUBJECTIVE AND OBJECTIVE BOX
MEDICAL TOXICOLOGY CONSULT    HPI:  18-year-old female with no significant past medical history presents with altered mental status from diphenhydramine overdose.  At 8 PM patient was found altered with pink emesis by her mother.  Last known normal was 7:30 PM today.  Patient's family found empty bottle of diphenhydramine that contains 600 tablets that was estimated to be around half full prior to patient's suicide attempts.  Patient noted to be tachycardic, obtunded in the ED. patient had 1 episode of witnessed seizure in the ED, patient was intubated thereafter and oral gastric tube suctioning returned pill fragments with gastric contents.    Vital signs: 157, 123/80, 21, 98.2, 96% on room air  EKG 2105: 135, 166, 88, 404  EKG 0238: 75, 146, 82, 484      PAST MEDICAL & SURGICAL HISTORY:  No pertinent past medical history      No significant past surgical history          MEDICATION HISTORY:  chlorhexidine 0.12% Liquid 15 milliLiter(s) Oral Mucosa every 12 hours  chlorhexidine 4% Liquid 1 Application(s) Topical <User Schedule>  enoxaparin Injectable 40 milliGRAM(s) SubCutaneous every 24 hours  lactated ringers. 1000 milliLiter(s) IV Continuous <Continuous>  propofol Infusion 45 MICROgram(s)/kG/Min IV Continuous <Continuous>      FAMILY HISTORY:  No pertinent family history in first degree relatives        PHYSICAL EXAM  Vital Signs Last 24 Hrs  T(C): 37.2 (23 Jul 2023 04:00), Max: 37.2 (23 Jul 2023 03:00)  T(F): 99 (23 Jul 2023 04:00), Max: 99 (23 Jul 2023 03:00)  HR: 80 (23 Jul 2023 04:23) (77 - 157)  BP: 108/82 (23 Jul 2023 04:00) (104/75 - 123/80)  BP(mean): 91 (23 Jul 2023 04:00) (85 - 91)  RR: 16 (23 Jul 2023 04:00) (16 - 21)  SpO2: 100% (23 Jul 2023 04:23) (96% - 100%)    Parameters below as of 23 Jul 2023 00:53  Patient On (Oxygen Delivery Method): ventilator        SIGNIFICANT LABORATORY STUDIES:                        11.4   14.75 )-----------( 179      ( 23 Jul 2023 03:30 )             33.0       07-23    141  |  109<H>  |  7   ----------------------------<  84  3.4<L>   |  21<L>  |  0.50    Ca    7.7<L>      23 Jul 2023 03:30  Phos  2.7     07-23  Mg     1.7     07-23    TPro  6.3  /  Alb  3.5  /  TBili  0.6  /  DBili  x   /  AST  14  /  ALT  17  /  AlkPhos  57  07-23          Urinalysis Basic - ( 23 Jul 2023 03:30 )    Color: x / Appearance: x / SG: x / pH: x  Gluc: 84 mg/dL / Ketone: x  / Bili: x / Urobili: x   Blood: x / Protein: x / Nitrite: x   Leuk Esterase: x / RBC: x / WBC x   Sq Epi: x / Non Sq Epi: x / Bacteria: x        Anion Gap: 11 07-23 @ 03:30  CK: 66 07-23 @ 03:30  Troponin:  --  07-23 @ 03:30  Pro-BNP:  --  07-23 @ 03:30  VBG:  --  07-23 @ 03:30  Carboxyhemoglobin %:  --  07-23 @ 03:30  Methemoglobin %:  --  07-23 @ 03:30  Osmolality Serum:  --  07-23 @ 03:30  Aspirin Level: --  07-23 @ 03:30  Acetaminophen Level:  --  07-23 @ 03:30  Ethanol Level:  --  07-23 @ 03:30  Digoxin Level:  --  07-23 @ 03:30  Phenytoin Level:  --  07-23 @ 03:30  Carbamazepine level:  --  07-23 @ 03:30  Lamotrigine level:  --  07-23 @ 03:30  Anion Gap: 13 07-22 @ 21:20  CK: -- 07-22 @ 21:20  Troponin:  --  07-22 @ 21:20  Pro-BNP:  --  07-22 @ 21:20  VBG:  --  07-22 @ 21:20  Carboxyhemoglobin %:  --  07-22 @ 21:20  Methemoglobin %:  --  07-22 @ 21:20  Osmolality Serum:  --  07-22 @ 21:20  Aspirin Level: <1.7<L>  07-22 @ 21:20  Acetaminophen Level:  <2<L>  07-22 @ 21:20  Ethanol Level:  --  07-22 @ 21:20  Digoxin Level:  --  07-22 @ 21:20  Phenytoin Level:  --  07-22 @ 21:20  Carbamazepine level:  --  07-22 @ 21:20  Lamotrigine level:  --  07-22 @ 21:20      RADIOLOGIC STUDIES

## 2023-07-23 NOTE — CONSULT NOTE ADULT - ASSESSMENT
18-year-old female presents with overdose on diphenhydramine, and is status post seizure, and endotracheal intubation in the ED.  Recommendations:    –Diphenhydramine is an H1 blocker, and can cause anticholinergic toxidrome in overdose situations (urinary retention, hyperthermia, decreased bowel sounds, dry mouth, agitation). In massive overdose (greater than 1 g) patients could also present with s delirium, seizures, coma, QRS widening, QTc prolongation.  –Recommend endotracheal intubation, gastric lavage followed by activated charcoal (1 g/kg).  If gastric lavage tube is not available, would recommend suctioning with NG or OG tube after airway has been secured.  –Patient is a candidate for physostigmine, but due to national shortage, the antidote is not available at this time.  Rivastigmine patch is not indicated at this time given patient will be intubated and sedated.  –Obtain EKGs every 4 hours x 4 to look for QRS widening and QTc prolongation.  Replete electrolytes as needed particularly magnesium and potassium.  If patient develops QRS widening, would recommend treatment with sodium bicarb.  Patient develops torsades to points, recommend treatment with magnesium.  –Close monitoring for hyperthermia, as patient can continue to exhibit anticholinergic symptoms while intubated.  Recommend benzodiazepines and cooling if patient develops hyperthermia.   – Rest of care per ICU  - discussed with attending    Thank you for this consult  Toxicology consults: [phone]  Please use the Adult Antidote orderset (search "antidote") for your tox patients. Items are pre-populated with doses and contain additional instruction on for pharmacy and nursing.

## 2023-07-23 NOTE — PROGRESS NOTE ADULT - SUBJECTIVE AND OBJECTIVE BOX
INTERVAL HPI/OVERNIGHT EVENTS: No acute events overnight. Pt arrived to the ICU intubated and sedated. Family at bedside. Spoke with toxicology, no role for pyridostigmine/rivastigmine.    PRESSORS: [ ] YES [x ] NO  WHICH:    Antimicrobial:    Cardiovascular:    Pulmonary:    Hematalogic:  enoxaparin Injectable 40 milliGRAM(s) SubCutaneous every 24 hours    Other:  chlorhexidine 4% Liquid 1 Application(s) Topical <User Schedule>  lactated ringers. 1000 milliLiter(s) IV Continuous <Continuous>  potassium chloride  10 mEq/100 mL IVPB 10 milliEquivalent(s) IV Intermittent every 1 hour  propofol Infusion 10 MICROgram(s)/kG/Min IV Continuous <Continuous>    chlorhexidine 4% Liquid 1 Application(s) Topical <User Schedule>  enoxaparin Injectable 40 milliGRAM(s) SubCutaneous every 24 hours  lactated ringers. 1000 milliLiter(s) IV Continuous <Continuous>  potassium chloride  10 mEq/100 mL IVPB 10 milliEquivalent(s) IV Intermittent every 1 hour  propofol Infusion 10 MICROgram(s)/kG/Min IV Continuous <Continuous>    Drug Dosing Weight  Height (cm): 162.6 (2023 20:48)  Weight (kg): 72.575 (2023 20:48)  BMI (kg/m2): 27.5 (2023 20:48)  BSA (m2): 1.78 (2023 20:48)    CENTRAL LINE: [ ] YES [ x] NO  LOCATION:   DATE INSERTED:  REMOVE: [ ] YES [ ] NO  EXPLAIN:    DONOHUE: [ ] YES [x ] NO    DATE INSERTED:  REMOVE:  [ ] YES [ ] NO  EXPLAIN:    A-LINE:  [ ] YES [x ] NO  LOCATION:   DATE INSERTED:  REMOVE:  [ ] YES [ ] NO  EXPLAIN:    PMH -reviewed admission note, no change since admission  PAST MEDICAL & SURGICAL HISTORY:  No pertinent past medical history      No significant past surgical history          ICU Vital Signs Last 24 Hrs  T(C): 36.8 (2023 02:00), Max: 36.8 (2023 20:48)  T(F): 98.2 (2023 02:00), Max: 98.2 (2023 20:48)  HR: 84 (2023 02:00) (83 - 157)  BP: 109/82 (2023 02:00) (109/82 - 123/80)  BP(mean): 91 (2023 02:00) (91 - 91)  ABP: --  ABP(mean): --  RR: 16 (2023 02:00) (16 - 21)  SpO2: 100% (2023 02:00) (96% - 100%)    O2 Parameters below as of 2023 00:53  Patient On (Oxygen Delivery Method): ventilator                    Mode: AC/ CMV (Assist Control/ Continuous Mandatory Ventilation)  RR (machine): 16  TV (machine): 450  FiO2: 40  PEEP: 5  ITime: 1  MAP: 8  PIP: 16      PHYSICAL EXAM:    GENERAL: intubated and sedated   HEAD:  Atraumatic, Normocephalic  EYES: EOMI, PERRLA, conjunctiva and sclera clear, no mydriasis noted   NECK: Supple, normal appearance, No JVD; Normal thyroid; Trachea midline  NERVOUS SYSTEM:  intubated and sedated   CHEST/LUNG: No chest deformity; Normal percussion bilaterally; No rales, rhonchi, wheezing   HEART: Regular rate and rhythm; No murmurs, rubs, or gallops  ABDOMEN: Soft, Nontender, Nondistended; Bowel sounds present  EXTREMITIES:  2+ Peripheral Pulses, No clubbing, cyanosis, or edema  LYMPH: No lymphadenopathy noted  SKIN: No rashes or lesions;  Good capillary refill      LABS:  CBC Full  -  ( 2023 21:20 )  WBC Count : 9.02 K/uL  RBC Count : 3.93 M/uL  Hemoglobin : 12.6 g/dL  Hematocrit : 37.1 %  Platelet Count - Automated : 204 K/uL  Mean Cell Volume : 94.4 fl  Mean Cell Hemoglobin : 32.1 pg  Mean Cell Hemoglobin Concentration : 34.0 gm/dL  Auto Neutrophil # : 6.20 K/uL  Auto Lymphocyte # : 2.17 K/uL  Auto Monocyte # : 0.58 K/uL  Auto Eosinophil # : 0.02 K/uL  Auto Basophil # : 0.03 K/uL  Auto Neutrophil % : 68.8 %  Auto Lymphocyte % : 24.1 %  Auto Monocyte % : 6.4 %  Auto Eosinophil % : 0.2 %  Auto Basophil % : 0.3 %    -    139  |  105  |  10  ----------------------------<  99  2.7<LL>   |  21<L>  |  0.62    Ca    8.8      2023 21:20    TPro  7.8  /  Alb  4.4  /  TBili  0.7  /  DBili  x   /  AST  16  /  ALT  19  /  AlkPhos  72  07-22      Urinalysis Basic - ( 2023 22:48 )    Color: Yellow / Appearance: Clear / S.010 / pH: x  Gluc: x / Ketone: Small  / Bili: Negative / Urobili: Negative   Blood: x / Protein: 15 mg/dL / Nitrite: Negative   Leuk Esterase: Negative / RBC: 2-5 /HPF / WBC 3-5 /HPF   Sq Epi: x / Non Sq Epi: x / Bacteria: Trace /HPF          RADIOLOGY & ADDITIONAL STUDIES REVIEWED:  ***    [ ]GOALS OF CARE DISCUSSION WITH PATIENT/FAMILY/PROXY:    CRITICAL CARE TIME SPENT: 35 minutes INTERVAL HPI/OVERNIGHT EVENTS: No acute events overnight. Pt arrived to the ICU intubated and sedated. Family at bedside. Spoke with toxicology, no role for pyridostigmine/rivastigmine.    PRESSORS: [ ] YES [x ] NO  WHICH:    Antimicrobial:    Cardiovascular:    Pulmonary:    Hematalogic:  enoxaparin Injectable 40 milliGRAM(s) SubCutaneous every 24 hours    Other:  chlorhexidine 4% Liquid 1 Application(s) Topical <User Schedule>  lactated ringers. 1000 milliLiter(s) IV Continuous <Continuous>  potassium chloride  10 mEq/100 mL IVPB 10 milliEquivalent(s) IV Intermittent every 1 hour  propofol Infusion 10 MICROgram(s)/kG/Min IV Continuous <Continuous>    chlorhexidine 4% Liquid 1 Application(s) Topical <User Schedule>  enoxaparin Injectable 40 milliGRAM(s) SubCutaneous every 24 hours  lactated ringers. 1000 milliLiter(s) IV Continuous <Continuous>  potassium chloride  10 mEq/100 mL IVPB 10 milliEquivalent(s) IV Intermittent every 1 hour  propofol Infusion 10 MICROgram(s)/kG/Min IV Continuous <Continuous>    Drug Dosing Weight  Height (cm): 162.6 (2023 20:48)  Weight (kg): 72.575 (2023 20:48)  BMI (kg/m2): 27.5 (2023 20:48)  BSA (m2): 1.78 (2023 20:48)    CENTRAL LINE: [ ] YES [ x] NO  LOCATION:   DATE INSERTED:  REMOVE: [ ] YES [ ] NO  EXPLAIN:    DONOHUE: [x] YES [ ] NO    DATE INSERTED: 23  REMOVE:  [ ] YES [ ] NO  EXPLAIN:    A-LINE:  [ ] YES [x ] NO  LOCATION:   DATE INSERTED:  REMOVE:  [ ] YES [ ] NO  EXPLAIN:    PMH -reviewed admission note, no change since admission  PAST MEDICAL & SURGICAL HISTORY:  No pertinent past medical history      No significant past surgical history          ICU Vital Signs Last 24 Hrs  T(C): 36.8 (2023 02:00), Max: 36.8 (2023 20:48)  T(F): 98.2 (2023 02:00), Max: 98.2 (2023 20:48)  HR: 84 (2023 02:00) (83 - 157)  BP: 109/82 (2023 02:00) (109/82 - 123/80)  BP(mean): 91 (2023 02:00) (91 - 91)  ABP: --  ABP(mean): --  RR: 16 (2023 02:00) (16 - 21)  SpO2: 100% (2023 02:00) (96% - 100%)    O2 Parameters below as of 2023 00:53  Patient On (Oxygen Delivery Method): ventilator                    Mode: AC/ CMV (Assist Control/ Continuous Mandatory Ventilation)  RR (machine): 16  TV (machine): 450  FiO2: 40  PEEP: 5  ITime: 1  MAP: 8  PIP: 16      PHYSICAL EXAM:    GENERAL: intubated and sedated   HEAD:  Atraumatic, Normocephalic  EYES: EOMI, PERRLA, conjunctiva and sclera clear, no mydriasis noted   NECK: Supple, normal appearance, No JVD; Normal thyroid; Trachea midline  NERVOUS SYSTEM:  intubated and sedated   CHEST/LUNG: No chest deformity; Normal percussion bilaterally; No rales, rhonchi, wheezing   HEART: Regular rate and rhythm; No murmurs, rubs, or gallops  ABDOMEN: Soft, Nontender, Nondistended; Bowel sounds present  EXTREMITIES:  2+ Peripheral Pulses, No clubbing, cyanosis, or edema  LYMPH: No lymphadenopathy noted  SKIN: Wrist scars      LABS:  CBC Full  -  ( 2023 21:20 )  WBC Count : 9.02 K/uL  RBC Count : 3.93 M/uL  Hemoglobin : 12.6 g/dL  Hematocrit : 37.1 %  Platelet Count - Automated : 204 K/uL  Mean Cell Volume : 94.4 fl  Mean Cell Hemoglobin : 32.1 pg  Mean Cell Hemoglobin Concentration : 34.0 gm/dL  Auto Neutrophil # : 6.20 K/uL  Auto Lymphocyte # : 2.17 K/uL  Auto Monocyte # : 0.58 K/uL  Auto Eosinophil # : 0.02 K/uL  Auto Basophil # : 0.03 K/uL  Auto Neutrophil % : 68.8 %  Auto Lymphocyte % : 24.1 %  Auto Monocyte % : 6.4 %  Auto Eosinophil % : 0.2 %  Auto Basophil % : 0.3 %    -    139  |  105  |  10  ----------------------------<  99  2.7<LL>   |  21<L>  |  0.62    Ca    8.8      2023 21:20    TPro  7.8  /  Alb  4.4  /  TBili  0.7  /  DBili  x   /  AST  16  /  ALT  19  /  AlkPhos  72  07-      Urinalysis Basic - ( 2023 22:48 )    Color: Yellow / Appearance: Clear / S.010 / pH: x  Gluc: x / Ketone: Small  / Bili: Negative / Urobili: Negative   Blood: x / Protein: 15 mg/dL / Nitrite: Negative   Leuk Esterase: Negative / RBC: 2-5 /HPF / WBC 3-5 /HPF   Sq Epi: x / Non Sq Epi: x / Bacteria: Trace /HPF          RADIOLOGY & ADDITIONAL STUDIES REVIEWED:      X-ray abdomen  Nonobstructive bowel gas pattern.    Side-port of enteric catheter in region of the GE junction. Recommend   advancement further into the stomach.    CT Abdomen  1. Endotracheal tube tip entering the right mainstem bronchus. Recommend   3 cm retraction.  2. Multifocal right lung opacities compatible with aspiration pneumonia.  3. Results discussed with Dr. Waed at time of interpretation.    EEG:  Abnormal EEG study.  Moderate nonspecific diffuse or multifocal cerebral dysfunction.   No epileptiform pattern or seizure seen.  Diffuse excess beta activity may be seen with medication use such as benzodiazepines or barbiturates.    [x]GOALS OF CARE DISCUSSION WITH PATIENT/FAMILY/PROXY:    CRITICAL CARE TIME SPENT: 35 minutes

## 2023-07-24 LAB
ALBUMIN SERPL ELPH-MCNC: 3.7 G/DL — SIGNIFICANT CHANGE UP (ref 3.5–5)
ALP SERPL-CCNC: 58 U/L — SIGNIFICANT CHANGE UP (ref 40–120)
ALT FLD-CCNC: 16 U/L DA — SIGNIFICANT CHANGE UP (ref 10–60)
ANION GAP SERPL CALC-SCNC: 7 MMOL/L — SIGNIFICANT CHANGE UP (ref 5–17)
AST SERPL-CCNC: 19 U/L — SIGNIFICANT CHANGE UP (ref 10–40)
BASOPHILS # BLD AUTO: 0.03 K/UL — SIGNIFICANT CHANGE UP (ref 0–0.2)
BASOPHILS NFR BLD AUTO: 0.3 % — SIGNIFICANT CHANGE UP (ref 0–2)
BILIRUB SERPL-MCNC: 0.5 MG/DL — SIGNIFICANT CHANGE UP (ref 0.2–1.2)
BUN SERPL-MCNC: 6 MG/DL — LOW (ref 7–18)
CALCIUM SERPL-MCNC: 8.7 MG/DL — SIGNIFICANT CHANGE UP (ref 8.4–10.5)
CHLORIDE SERPL-SCNC: 112 MMOL/L — HIGH (ref 96–108)
CO2 SERPL-SCNC: 24 MMOL/L — SIGNIFICANT CHANGE UP (ref 22–31)
CREAT SERPL-MCNC: 0.78 MG/DL — SIGNIFICANT CHANGE UP (ref 0.5–1.3)
EGFR: 111 ML/MIN/1.73M2 — SIGNIFICANT CHANGE UP
EOSINOPHIL # BLD AUTO: 0.04 K/UL — SIGNIFICANT CHANGE UP (ref 0–0.5)
EOSINOPHIL NFR BLD AUTO: 0.4 % — SIGNIFICANT CHANGE UP (ref 0–6)
GLUCOSE BLDC GLUCOMTR-MCNC: 75 MG/DL — SIGNIFICANT CHANGE UP (ref 70–99)
GLUCOSE SERPL-MCNC: 84 MG/DL — SIGNIFICANT CHANGE UP (ref 70–99)
GRAM STN FLD: SIGNIFICANT CHANGE UP
HCT VFR BLD CALC: 34.6 % — SIGNIFICANT CHANGE UP (ref 34.5–45)
HGB BLD-MCNC: 11.5 G/DL — SIGNIFICANT CHANGE UP (ref 11.5–15.5)
IMM GRANULOCYTES NFR BLD AUTO: 0.4 % — SIGNIFICANT CHANGE UP (ref 0–0.9)
LYMPHOCYTES # BLD AUTO: 1.49 K/UL — SIGNIFICANT CHANGE UP (ref 1–3.3)
LYMPHOCYTES # BLD AUTO: 13.1 % — SIGNIFICANT CHANGE UP (ref 13–44)
MAGNESIUM SERPL-MCNC: 2 MG/DL — SIGNIFICANT CHANGE UP (ref 1.6–2.6)
MCHC RBC-ENTMCNC: 32.4 PG — SIGNIFICANT CHANGE UP (ref 27–34)
MCHC RBC-ENTMCNC: 33.2 GM/DL — SIGNIFICANT CHANGE UP (ref 32–36)
MCV RBC AUTO: 97.5 FL — SIGNIFICANT CHANGE UP (ref 80–100)
MONOCYTES # BLD AUTO: 0.91 K/UL — HIGH (ref 0–0.9)
MONOCYTES NFR BLD AUTO: 8 % — SIGNIFICANT CHANGE UP (ref 2–14)
MRSA PCR RESULT.: SIGNIFICANT CHANGE UP
NEUTROPHILS # BLD AUTO: 8.9 K/UL — HIGH (ref 1.8–7.4)
NEUTROPHILS NFR BLD AUTO: 77.8 % — HIGH (ref 43–77)
NRBC # BLD: 0 /100 WBCS — SIGNIFICANT CHANGE UP (ref 0–0)
PHOSPHATE SERPL-MCNC: 3.8 MG/DL — SIGNIFICANT CHANGE UP (ref 2.5–4.5)
PLATELET # BLD AUTO: 177 K/UL — SIGNIFICANT CHANGE UP (ref 150–400)
POTASSIUM SERPL-MCNC: 3.7 MMOL/L — SIGNIFICANT CHANGE UP (ref 3.5–5.3)
POTASSIUM SERPL-SCNC: 3.7 MMOL/L — SIGNIFICANT CHANGE UP (ref 3.5–5.3)
PROT SERPL-MCNC: 7.2 G/DL — SIGNIFICANT CHANGE UP (ref 6–8.3)
RBC # BLD: 3.55 M/UL — LOW (ref 3.8–5.2)
RBC # FLD: 12.2 % — SIGNIFICANT CHANGE UP (ref 10.3–14.5)
S AUREUS DNA NOSE QL NAA+PROBE: SIGNIFICANT CHANGE UP
SODIUM SERPL-SCNC: 143 MMOL/L — SIGNIFICANT CHANGE UP (ref 135–145)
SPECIMEN SOURCE: SIGNIFICANT CHANGE UP
WBC # BLD: 11.41 K/UL — HIGH (ref 3.8–10.5)
WBC # FLD AUTO: 11.41 K/UL — HIGH (ref 3.8–10.5)

## 2023-07-24 PROCEDURE — 99233 SBSQ HOSP IP/OBS HIGH 50: CPT

## 2023-07-24 PROCEDURE — 71045 X-RAY EXAM CHEST 1 VIEW: CPT | Mod: 26

## 2023-07-24 PROCEDURE — 90792 PSYCH DIAG EVAL W/MED SRVCS: CPT

## 2023-07-24 RX ORDER — SODIUM CHLORIDE 9 MG/ML
1000 INJECTION, SOLUTION INTRAVENOUS
Refills: 0 | Status: DISCONTINUED | OUTPATIENT
Start: 2023-07-24 | End: 2023-07-25

## 2023-07-24 RX ORDER — ACETAMINOPHEN 500 MG
650 TABLET ORAL EVERY 6 HOURS
Refills: 0 | Status: DISCONTINUED | OUTPATIENT
Start: 2023-07-24 | End: 2023-07-26

## 2023-07-24 RX ORDER — CEFTRIAXONE 500 MG/1
1000 INJECTION, POWDER, FOR SOLUTION INTRAMUSCULAR; INTRAVENOUS EVERY 24 HOURS
Refills: 0 | Status: DISCONTINUED | OUTPATIENT
Start: 2023-07-24 | End: 2023-07-25

## 2023-07-24 RX ORDER — CEFEPIME 1 G/1
2000 INJECTION, POWDER, FOR SOLUTION INTRAMUSCULAR; INTRAVENOUS EVERY 8 HOURS
Refills: 0 | Status: DISCONTINUED | OUTPATIENT
Start: 2023-07-24 | End: 2023-07-24

## 2023-07-24 RX ORDER — ACETAMINOPHEN 500 MG
1000 TABLET ORAL ONCE
Refills: 0 | Status: COMPLETED | OUTPATIENT
Start: 2023-07-24 | End: 2023-07-24

## 2023-07-24 RX ADMIN — PROPOFOL 16.8 MICROGRAM(S)/KG/MIN: 10 INJECTION, EMULSION INTRAVENOUS at 05:23

## 2023-07-24 RX ADMIN — Medication 1000 MILLIGRAM(S): at 07:36

## 2023-07-24 RX ADMIN — SODIUM CHLORIDE 100 MILLILITER(S): 9 INJECTION, SOLUTION INTRAVENOUS at 17:08

## 2023-07-24 RX ADMIN — SODIUM CHLORIDE 100 MILLILITER(S): 9 INJECTION, SOLUTION INTRAVENOUS at 21:44

## 2023-07-24 RX ADMIN — CEFTRIAXONE 100 MILLIGRAM(S): 500 INJECTION, POWDER, FOR SOLUTION INTRAMUSCULAR; INTRAVENOUS at 13:52

## 2023-07-24 RX ADMIN — ENOXAPARIN SODIUM 40 MILLIGRAM(S): 100 INJECTION SUBCUTANEOUS at 05:23

## 2023-07-24 RX ADMIN — CHLORHEXIDINE GLUCONATE 15 MILLILITER(S): 213 SOLUTION TOPICAL at 05:23

## 2023-07-24 RX ADMIN — Medication 400 MILLIGRAM(S): at 06:46

## 2023-07-24 RX ADMIN — CHLORHEXIDINE GLUCONATE 1 APPLICATION(S): 213 SOLUTION TOPICAL at 05:23

## 2023-07-24 RX ADMIN — PANTOPRAZOLE SODIUM 40 MILLIGRAM(S): 20 TABLET, DELAYED RELEASE ORAL at 11:36

## 2023-07-24 NOTE — PROGRESS NOTE ADULT - TIME BILLING
review of chart and overnight events  review of labs and imaging  generation of plan of care  note completion  coordination of care with consultants  counseling pt on their disease process, management and prognosis.

## 2023-07-24 NOTE — CHART NOTE - NSCHARTNOTEFT_GEN_A_CORE
Patient is a 17 yo female with no PMH presents with altered mental status after ingesting unknown amounts of diphenhydramine (approx 300 pills; 600 count bottle which was said to be half empty was found to be empty). Upon evaluation in ED, patient afebrile, noted to be tachycardic to 157 bpm but hemodynamically stable. Patient noted to have 3 minute generalized tonic- clonic seizure in which she was unable to protect her airway and subsequently intubated and ativan was given. Gastric lavage was done showing pill fragments. Labs have thus far been largely within normal range. Pt was admitted to the ICU for Acute Encephalopathy 2/2 Suspected Diphenhydramine overdose. Serial EKG's were done and showed QRS and QTc remaining within normal limits. CT head negative for acute injury and EEG negative for epileptiform activity, however chest CT revealed signs of aspiration. Vitals have largely remained within normal limits with the exception of a fever which began 7/23. In response, the pt was started on antibiotics. On 7/24, the pt underwent successful SAT/SBT. She was able to breath without any distress ORA and was found to be A&Ox3 after the coming out of sedation. She subsequently was put on 1to1 monitoring and met with the psychiatrist, Dr. Montalvo, who confirmed that the pt did try to commit suicide and that she has been experiencing depression. He recommend that the pt be downgraded from the ICU once medically stable and be subsequently admitted into psychiatric care.      Patient is stable for downgrade to floor under care of  ------------- for further management , covering resident ---------- was informed. Family notified.    Thing to be followed up:  - Blood and sputum cx  - c/w abx  - c/w 1to1  - f/u toxicology recs  - f/u dc planning to inpatient psych (Dr. Montalvo following)  - TOV at 10 pm Patient is a 19 yo female with no PMH presents with altered mental status after ingesting unknown amounts of diphenhydramine (approx 300 pills; 600 count bottle which was said to be half empty was found to be empty). Upon evaluation in ED, patient afebrile, noted to be tachycardic to 157 bpm but hemodynamically stable. Patient noted to have 3 minute generalized tonic- clonic seizure in which she was unable to protect her airway and subsequently intubated and ativan was given. Gastric lavage was done showing pill fragments. Labs have thus far been largely within normal range. Pt was admitted to the ICU for Acute Encephalopathy 2/2 Suspected Diphenhydramine overdose. Serial EKG's were done and showed QRS and QTc remaining within normal limits. CT head negative for acute injury and EEG negative for epileptiform activity, however chest CT revealed signs of aspiration. Vitals have largely remained within normal limits with the exception of a fever which began 7/23. In response, the pt was started on antibiotics. On 7/24, the pt underwent successful SAT/SBT. She was able to breath without any distress ORA and was found to be A&Ox3 after the coming out of sedation. She subsequently was put on 1to1 monitoring and met with the psychiatrist, Dr. Montalvo, who confirmed that the pt did try to commit suicide and that she has been experiencing depression. He recommend that the pt be downgraded from the ICU once medically stable and be subsequently admitted into psychiatric care.      Patient is stable for downgrade to floor under care of Dr. phillips for further management , covering NP Latitia was informed. Family notified.    Thing to be followed up:  - Blood and sputum cx  - c/w abx  - c/w 1 to1  - f/u toxicology recs  - f/u dc planning to inpatient psych (Dr. Montalvo following)  - TOV at 10 pm Patient is a 19 yo female with no PMH presents with altered mental status after ingesting unknown amounts of diphenhydramine (approx 300 pills; 600 count bottle which was said to be half empty was found to be empty). Upon evaluation in ED, patient afebrile, noted to be tachycardic to 157 bpm but hemodynamically stable. Patient noted to have 3 minute generalized tonic- clonic seizure in which she was unable to protect her airway and subsequently intubated and ativan was given. Gastric lavage was done showing pill fragments. Labs have thus far been largely within normal range. Pt was admitted to the ICU for Acute Encephalopathy 2/2 Suspected Diphenhydramine overdose. Serial EKG's were done and showed QRS and QTc remaining within normal limits. CT head negative for acute injury and EEG negative for epileptiform activity, however chest CT revealed signs of aspiration. Vitals have largely remained within normal limits with the exception of a fever which began 7/23. In response, the pt was started on antibiotics. On 7/24, the pt underwent successful SAT/SBT. She was able to breath without any distress ORA and was found to be A&Ox3 after the coming out of sedation. She subsequently was put on 1to1 monitoring and met with the psychiatrist, Dr. Montalvo, who confirmed that the pt did try to commit suicide and that she has been experiencing depression. He recommend that the pt be downgraded from the ICU once medically stable and be subsequently admitted into psychiatric care.      Patient is stable for downgrade to floor under care of Dr. phillips for further management , covering NP Latitia was informed. Family notified.    Thing to be followed up:  - Blood and sputum cx  - c/w abx  - c/w 1 to1  - f/u toxicology recs  - f/u dc planning to inpatient psych (Dr. Montalvo following)  - pt passed TOV

## 2023-07-24 NOTE — BH CONSULTATION LIAISON ASSESSMENT NOTE - NSBHCHARTREVIEWVS_PSY_A_CORE FT
Vital Signs Last 24 Hrs  T(C): 37.7 (24 Jul 2023 14:00), Max: 38.5 (24 Jul 2023 07:00)  T(F): 99.9 (24 Jul 2023 14:00), Max: 101.3 (24 Jul 2023 07:00)  HR: 82 (24 Jul 2023 14:00) (62 - 127)  BP: 119/74 (24 Jul 2023 14:00) (99/63 - 122/86)  BP(mean): 88 (24 Jul 2023 14:00) (74 - 96)  RR: 18 (24 Jul 2023 14:00) (13 - 29)  SpO2: 100% (24 Jul 2023 14:00) (97% - 100%)    Parameters below as of 24 Jul 2023 04:00  Patient On (Oxygen Delivery Method): ventilator  O2 Flow (L/min): 40

## 2023-07-24 NOTE — BH CONSULTATION LIAISON ASSESSMENT NOTE - DETAILS
Patient OD'ed on an undetermined amount of pills requiring mechanical ventilation and an ICU admission.

## 2023-07-24 NOTE — PROGRESS NOTE ADULT - ATTENDING COMMENTS
17 y/o with suspected intentional benadryl overdose. S/p Seizure now intubated and deeply sedated.     Assessment:  1. Acute benadryl overdose  2. Acute respiratory failure  3. Seizure  4. Hypokalemia   5. Left wrist lacerations    Plan:  -Keep sedated  -Monitor for further seizures  -Toxicology recs noted  -S/p activated charcoal  -Monitor BMP and supplement electrolytes aggressively  -Serial EKG monitoring  -Cont. mechanical ventilation  -Sierra to maintain urine flow as may have urinary retention  -DVT and stress ulcer prophylaxis  -NPO for now  -IV fluid hydration  -Will need psych eval and 1:1 observation for possible suicide watch once extubated  -Superficial wrist laceration on the left, local wound care  -Cont. ICU care
Patient is a 19 yo female with no PMH presents with altered mental status after ingesting unknown amounts of diphenhydramine. Upon evaluation in ED, patient afebrile, noted to be tachycardic to 157 bpm but hemodynamically stable. Patient noted to have 3 minute generalized tonic- clonic seizure in which she was unable to protect her airway and subsequently intubated and ativan was given. Gastric lavage was done showing pill fragments. Labs have thus far been largely within normal range. Serial EKG's were done and showed QRS and QTc remaining within normal limits. CT head negative for acute injury and EEG negative for epileptiform activity, however chest CT revealed signs of aspiration. Vitals have largely remained within normal limits with the exception of a fever which began 7/23. In response, the pt was started on antibiotics. The pt otherwise remains in ICU for continued management of her Acute Encephalopathy 2/2 Suspected Diphenhydramine overdose.     Extubated  Clinically stable  CTX for suspected asp pna  1:1 and psych eval  will need inpatient psych transfer

## 2023-07-24 NOTE — BH CONSULTATION LIAISON ASSESSMENT NOTE - CURRENT MEDICATION
MEDICATIONS  (STANDING):  cefTRIAXone   IVPB 1000 milliGRAM(s) IV Intermittent every 24 hours  chlorhexidine 2% Cloths 1 Application(s) Topical <User Schedule>  enoxaparin Injectable 40 milliGRAM(s) SubCutaneous every 24 hours  lactated ringers. 1000 milliLiter(s) (100 mL/Hr) IV Continuous <Continuous>  pantoprazole  Injectable 40 milliGRAM(s) IV Push daily  propofol Infusion 45 MICROgram(s)/kG/Min (16.8 mL/Hr) IV Continuous <Continuous>    MEDICATIONS  (PRN):

## 2023-07-24 NOTE — BH CONSULTATION LIAISON ASSESSMENT NOTE - HPI (INCLUDE ILLNESS QUALITY, SEVERITY, DURATION, TIMING, CONTEXT, MODIFYING FACTORS, ASSOCIATED SIGNS AND SYMPTOMS)
17 y/o F with no past hx, who is admitted due after an OD with Benadryl. As per chart, she suffered from a seizure and required endotracheal intubation and an ICU admission. She is consulted for evaluation of a suspected suicide attempt. Patient was groggy, but able to participate of an evaluation. She reports that she has been feeling depressed for several weeks, but is unable of explaining why. Says that she took an unknown number of pills in an effort to kill herself. Says that she is embarrassed about what happened and is concerned that her mother will be upset with her. Reports some feelings of hopelessness and guilt. Denies any recent family or work stressors. Also acknowledges several cuts to her left wrist done with the purpose of harming herself. Denies any SI at the moment. Denies any HI or AVH.    Collateral: Spoke with the patient's sister. Says that her mother found her unconscious and called her. Says that they found and previous documentation confirms taking an unverified, but possibly hundreds of Benadryl tablets. Says that the family was not aware that the patient was depressed.

## 2023-07-24 NOTE — BH CONSULTATION LIAISON ASSESSMENT NOTE - SUMMARY
19 y/o F with no past hx, who is admitted due after an OD with Benadryl. As per chart, she suffered from a seizure and required endotracheal intubation and an ICU admission. She is consulted for evaluation of a suspected suicide attempt. Patient with severe depressive symptoms and a suicidal attempt by medication overdose as well as parasuicidal behavior by cutting her wrists. Will need an inpatient psychiatric hospitalization for stabilization and observation. She is not homicidal or psychotic.    -Admit to an inpatient psychiatric unit upon medical clearance  -Cont 1:1  -Consider starting Zoloft 25 mg PO within 24 hrs  -PRN: Haldol 1/Ativan 1/Benadryl 25 mg IM q 8 hrs for agitation  -SW eval  -Case discussed with the primary team  -Will follow as needed

## 2023-07-24 NOTE — CHART NOTE - NSCHARTNOTEFT_GEN_A_CORE
Spoke to pt's sister/emergency contact. She was notified that the pt was successfully taken off sedation and extubated. She is aware of patients condition and care plan. All her questions were answered.

## 2023-07-24 NOTE — PROGRESS NOTE ADULT - SUBJECTIVE AND OBJECTIVE BOX
INTERVAL HPI/OVERNIGHT EVENTS: ***    PRESSORS: [ ] YES [ ] NO  WHICH:    Antimicrobial:  cefepime   IVPB 2000 milliGRAM(s) IV Intermittent every 8 hours    Cardiovascular:    Pulmonary:    Hematalogic:  enoxaparin Injectable 40 milliGRAM(s) SubCutaneous every 24 hours    Other:  chlorhexidine 0.12% Liquid 15 milliLiter(s) Oral Mucosa every 12 hours  chlorhexidine 2% Cloths 1 Application(s) Topical <User Schedule>  lactated ringers. 1000 milliLiter(s) IV Continuous <Continuous>  pantoprazole  Injectable 40 milliGRAM(s) IV Push daily  propofol Infusion 45 MICROgram(s)/kG/Min IV Continuous <Continuous>    cefepime   IVPB 2000 milliGRAM(s) IV Intermittent every 8 hours  chlorhexidine 0.12% Liquid 15 milliLiter(s) Oral Mucosa every 12 hours  chlorhexidine 2% Cloths 1 Application(s) Topical <User Schedule>  enoxaparin Injectable 40 milliGRAM(s) SubCutaneous every 24 hours  lactated ringers. 1000 milliLiter(s) IV Continuous <Continuous>  pantoprazole  Injectable 40 milliGRAM(s) IV Push daily  propofol Infusion 45 MICROgram(s)/kG/Min IV Continuous <Continuous>    Drug Dosing Weight  Height (cm): 162.6 (22 Jul 2023 20:48)  Weight (kg): 62.3 (23 Jul 2023 03:00)  BMI (kg/m2): 23.6 (23 Jul 2023 03:00)  BSA (m2): 1.67 (23 Jul 2023 03:00)    CENTRAL LINE: [ ] YES [ ] NO  LOCATION:   DATE INSERTED:  REMOVE: [ ] YES [ ] NO  EXPLAIN:    DONOHUE: [ ] YES [ ] NO    DATE INSERTED:  REMOVE:  [ ] YES [ ] NO  EXPLAIN:    A-LINE:  [ ] YES [ ] NO  LOCATION:   DATE INSERTED:  REMOVE:  [ ] YES [ ] NO  EXPLAIN:    PMH -reviewed admission note, no change since admission  PAST MEDICAL & SURGICAL HISTORY:  No pertinent past medical history      No significant past surgical history          ICU Vital Signs Last 24 Hrs  T(C): 38.5 (24 Jul 2023 07:00), Max: 38.5 (24 Jul 2023 07:00)  T(F): 101.3 (24 Jul 2023 07:00), Max: 101.3 (24 Jul 2023 07:00)  HR: 72 (24 Jul 2023 07:00) (62 - 90)  BP: 111/72 (24 Jul 2023 07:00) (92/65 - 122/86)  BP(mean): 84 (24 Jul 2023 07:00) (74 - 96)  ABP: --  ABP(mean): --  RR: 24 (24 Jul 2023 07:00) (12 - 24)  SpO2: 100% (24 Jul 2023 07:00) (100% - 100%)    O2 Parameters below as of 24 Jul 2023 04:00  Patient On (Oxygen Delivery Method): ventilator  O2 Flow (L/min): 40          ABG - ( 23 Jul 2023 03:57 )  pH, Arterial: 7.49  pH, Blood: x     /  pCO2: 29    /  pO2: 80    / HCO3: 22    / Base Excess: -0.2  /  SaO2: 97                    07-23 @ 07:01  -  07-24 @ 07:00  --------------------------------------------------------  IN: 2303.2 mL / OUT: 3150 mL / NET: -846.8 mL        Mode: AC/ CMV (Assist Control/ Continuous Mandatory Ventilation)  RR (machine): 12  TV (machine): 400  FiO2: 40  PEEP: 5  ITime: 1  MAP: 7  PIP: 13      PHYSICAL EXAM:    GENERAL: NAD, pt sitting comfortably in bed  HEAD:  Atraumatic, Normocephalic  EYES: EOMI, anicteric   ENMT: MMM; No tonsillar erythema or exudates  NECK: Supple, normal appearance, No JVD; Normal thyroid; Trachea midline  NERVOUS SYSTEM:  Alert & Oriented X3, no appreciable gross or focal deficits    CHEST/LUNG: No chest deformity; No rales or wheezing   HEART: Regular rate and rhythm; No abnormal heart sounds  ABDOMEN: Soft, Nontender, Nondistended; Bowel sounds present  EXTREMITIES:  2+ Peripheral Pulses, No appreciable edema  LYMPH: No lymphadenopathy noted  SKIN: No rashes or lesions;  Good capillary refill      LABS:  CBC Full  -  ( 24 Jul 2023 04:47 )  WBC Count : 11.41 K/uL  RBC Count : 3.55 M/uL  Hemoglobin : 11.5 g/dL  Hematocrit : 34.6 %  Platelet Count - Automated : 177 K/uL  Mean Cell Volume : 97.5 fl  Mean Cell Hemoglobin : 32.4 pg  Mean Cell Hemoglobin Concentration : 33.2 gm/dL  Auto Neutrophil # : 8.90 K/uL  Auto Lymphocyte # : 1.49 K/uL  Auto Monocyte # : 0.91 K/uL  Auto Eosinophil # : 0.04 K/uL  Auto Basophil # : 0.03 K/uL  Auto Neutrophil % : 77.8 %  Auto Lymphocyte % : 13.1 %  Auto Monocyte % : 8.0 %  Auto Eosinophil % : 0.4 %  Auto Basophil % : 0.3 %    07-24    143  |  112<H>  |  6<L>  ----------------------------<  84  3.7   |  24  |  0.78    Ca    8.7      24 Jul 2023 04:47  Phos  3.8     07-24  Mg     2.0     07-24    TPro  7.2  /  Alb  3.7  /  TBili  0.5  /  DBili  x   /  AST  19  /  ALT  16  /  AlkPhos  58  07-24      Urinalysis Basic - ( 24 Jul 2023 04:47 )    Color: x / Appearance: x / SG: x / pH: x  Gluc: 84 mg/dL / Ketone: x  / Bili: x / Urobili: x   Blood: x / Protein: x / Nitrite: x   Leuk Esterase: x / RBC: x / WBC x   Sq Epi: x / Non Sq Epi: x / Bacteria: x      Culture Results:   No growth at 24 hours (07-23 @ 00:28)  Culture Results:   No growth at 24 hours (07-23 @ 00:25)      RADIOLOGY & ADDITIONAL STUDIES REVIEWED:  ***    [ ]GOALS OF CARE DISCUSSION WITH PATIENT/FAMILY/PROXY:    CRITICAL CARE TIME SPENT: 35 minutes INTERVAL HPI/OVERNIGHT EVENTS:  No acute events overnight.  Patient examined at bedside this AM as she was coming off sedation. Will assess mentation and conduct more detailed patient interview later in the day.      PRESSORS: [ ] YES [x NO  WHICH:    Antimicrobial:  cefepime   IVPB 2000 milliGRAM(s) IV Intermittent every 8 hours    Cardiovascular:    Pulmonary:    Hematalogic:  enoxaparin Injectable 40 milliGRAM(s) SubCutaneous every 24 hours    Other:  chlorhexidine 0.12% Liquid 15 milliLiter(s) Oral Mucosa every 12 hours  chlorhexidine 2% Cloths 1 Application(s) Topical <User Schedule>  lactated ringers. 1000 milliLiter(s) IV Continuous <Continuous>  pantoprazole  Injectable 40 milliGRAM(s) IV Push daily  propofol Infusion 45 MICROgram(s)/kG/Min IV Continuous <Continuous>    cefepime   IVPB 2000 milliGRAM(s) IV Intermittent every 8 hours  chlorhexidine 0.12% Liquid 15 milliLiter(s) Oral Mucosa every 12 hours  chlorhexidine 2% Cloths 1 Application(s) Topical <User Schedule>  enoxaparin Injectable 40 milliGRAM(s) SubCutaneous every 24 hours  lactated ringers. 1000 milliLiter(s) IV Continuous <Continuous>  pantoprazole  Injectable 40 milliGRAM(s) IV Push daily  propofol Infusion 45 MICROgram(s)/kG/Min IV Continuous <Continuous>    Drug Dosing Weight  Height (cm): 162.6 (22 Jul 2023 20:48)  Weight (kg): 62.3 (23 Jul 2023 03:00)  BMI (kg/m2): 23.6 (23 Jul 2023 03:00)  BSA (m2): 1.67 (23 Jul 2023 03:00)    CENTRAL LINE: [ ] YES [x NO  LOCATION:   DATE INSERTED:  REMOVE: [ ] YES [ ] NO  EXPLAIN:    DONOHUE: [x YES [ ] NO    DATE INSERTED: 7/22/23  REMOVE:  [ ] YES [ ] NO  EXPLAIN:    A-LINE:  [ ] YES [x NO  LOCATION:   DATE INSERTED:  REMOVE:  [ ] YES [ ] NO  EXPLAIN:    PMH -reviewed admission note, no change since admission  PAST MEDICAL & SURGICAL HISTORY:  No pertinent past medical history      No significant past surgical history          ICU Vital Signs Last 24 Hrs  T(C): 38.5 (24 Jul 2023 07:00), Max: 38.5 (24 Jul 2023 07:00)  T(F): 101.3 (24 Jul 2023 07:00), Max: 101.3 (24 Jul 2023 07:00)  HR: 72 (24 Jul 2023 07:00) (62 - 90)  BP: 111/72 (24 Jul 2023 07:00) (92/65 - 122/86)  BP(mean): 84 (24 Jul 2023 07:00) (74 - 96)  ABP: --  ABP(mean): --  RR: 24 (24 Jul 2023 07:00) (12 - 24)  SpO2: 100% (24 Jul 2023 07:00) (100% - 100%)    O2 Parameters below as of 24 Jul 2023 04:00  Patient On (Oxygen Delivery Method): ventilator  O2 Flow (L/min): 40          ABG - ( 23 Jul 2023 03:57 )  pH, Arterial: 7.49  pH, Blood: x     /  pCO2: 29    /  pO2: 80    / HCO3: 22    / Base Excess: -0.2  /  SaO2: 97                    07-23 @ 07:01  -  07-24 @ 07:00  --------------------------------------------------------  IN: 2303.2 mL / OUT: 3150 mL / NET: -846.8 mL        Mode: AC/ CMV (Assist Control/ Continuous Mandatory Ventilation)  RR (machine): 12  TV (machine): 400  FiO2: 40  PEEP: 5  ITime: 1  MAP: 7  PIP: 13      PHYSICAL EXAM:    GENERAL: Pt seem lying in bed, appears fatigued as she comes off sedation  HEAD:  Atraumatic, Normocephalic  EYES: EOMI, pupils responsive to light  MMM; No tonsillar erythema or exudates  NECK: Nrmal appearance, No JVD; Trachea midline; intubation tube still in place  NERVOUS SYSTEM:  Pt appears to be aware of her surroundings, will conduct more complete neural assessment once pt is fully extubated  CHEST/LUNG: No chest deformity; No rales or wheezing   HEART: Regular rate and rhythm; No abnormal heart sounds  ABDOMEN: Soft, Nontender, Nondistended; Bowel sounds present  EXTREMITIES:  2+ Peripheral Pulses, No appreciable edema  LYMPH: No lymphadenopathy noted  SKIN: No rashes or lesions;  Good capillary refill      LABS:  CBC Full  -  ( 24 Jul 2023 04:47 )  WBC Count : 11.41 K/uL  RBC Count : 3.55 M/uL  Hemoglobin : 11.5 g/dL  Hematocrit : 34.6 %  Platelet Count - Automated : 177 K/uL  Mean Cell Volume : 97.5 fl  Mean Cell Hemoglobin : 32.4 pg  Mean Cell Hemoglobin Concentration : 33.2 gm/dL  Auto Neutrophil # : 8.90 K/uL  Auto Lymphocyte # : 1.49 K/uL  Auto Monocyte # : 0.91 K/uL  Auto Eosinophil # : 0.04 K/uL  Auto Basophil # : 0.03 K/uL  Auto Neutrophil % : 77.8 %  Auto Lymphocyte % : 13.1 %  Auto Monocyte % : 8.0 %  Auto Eosinophil % : 0.4 %  Auto Basophil % : 0.3 %    07-24    143  |  112<H>  |  6<L>  ----------------------------<  84  3.7   |  24  |  0.78    Ca    8.7      24 Jul 2023 04:47  Phos  3.8     07-24  Mg     2.0     07-24    TPro  7.2  /  Alb  3.7  /  TBili  0.5  /  DBili  x   /  AST  19  /  ALT  16  /  AlkPhos  58  07-24      Urinalysis Basic - ( 24 Jul 2023 04:47 )    Color: x / Appearance: x / SG: x / pH: x  Gluc: 84 mg/dL / Ketone: x  / Bili: x / Urobili: x   Blood: x / Protein: x / Nitrite: x   Leuk Esterase: x / RBC: x / WBC x   Sq Epi: x / Non Sq Epi: x / Bacteria: x      Culture Results:   No growth at 24 hours (07-23 @ 00:28)  Culture Results:   No growth at 24 hours (07-23 @ 00:25)      RADIOLOGY & ADDITIONAL STUDIES REVIEWED:  ***    [ ]GOALS OF CARE DISCUSSION WITH PATIENT/FAMILY/PROXY:    CRITICAL CARE TIME SPENT: 35 minutes INTERVAL HPI/OVERNIGHT EVENTS:  No acute events overnight.  Patient examined at bedside this AM as she was coming off sedation. Will assess mentation and conduct more detailed patient interview later in the day.      PRESSORS: [ ] YES [x NO  WHICH:    Antimicrobial:  cefepime   IVPB 2000 milliGRAM(s) IV Intermittent every 8 hours    Cardiovascular:    Pulmonary:    Hematalogic:  enoxaparin Injectable 40 milliGRAM(s) SubCutaneous every 24 hours    Other:  chlorhexidine 0.12% Liquid 15 milliLiter(s) Oral Mucosa every 12 hours  chlorhexidine 2% Cloths 1 Application(s) Topical <User Schedule>  lactated ringers. 1000 milliLiter(s) IV Continuous <Continuous>  pantoprazole  Injectable 40 milliGRAM(s) IV Push daily  propofol Infusion 45 MICROgram(s)/kG/Min IV Continuous <Continuous>    cefepime   IVPB 2000 milliGRAM(s) IV Intermittent every 8 hours  chlorhexidine 0.12% Liquid 15 milliLiter(s) Oral Mucosa every 12 hours  chlorhexidine 2% Cloths 1 Application(s) Topical <User Schedule>  enoxaparin Injectable 40 milliGRAM(s) SubCutaneous every 24 hours  lactated ringers. 1000 milliLiter(s) IV Continuous <Continuous>  pantoprazole  Injectable 40 milliGRAM(s) IV Push daily  propofol Infusion 45 MICROgram(s)/kG/Min IV Continuous <Continuous>    Drug Dosing Weight  Height (cm): 162.6 (22 Jul 2023 20:48)  Weight (kg): 62.3 (23 Jul 2023 03:00)  BMI (kg/m2): 23.6 (23 Jul 2023 03:00)  BSA (m2): 1.67 (23 Jul 2023 03:00)    CENTRAL LINE: [ ] YES [x NO  LOCATION:   DATE INSERTED:  REMOVE: [ ] YES [ ] NO  EXPLAIN:    DONOHUE: [x YES [ ] NO    DATE INSERTED: 7/23/23  REMOVE:  [ ] YES [ ] NO  EXPLAIN:    A-LINE:  [ ] YES [x NO  LOCATION:   DATE INSERTED:  REMOVE:  [ ] YES [ ] NO  EXPLAIN:    PMH -reviewed admission note, no change since admission  PAST MEDICAL & SURGICAL HISTORY:  No pertinent past medical history      No significant past surgical history          ICU Vital Signs Last 24 Hrs  T(C): 38.5 (24 Jul 2023 07:00), Max: 38.5 (24 Jul 2023 07:00)  T(F): 101.3 (24 Jul 2023 07:00), Max: 101.3 (24 Jul 2023 07:00)  HR: 72 (24 Jul 2023 07:00) (62 - 90)  BP: 111/72 (24 Jul 2023 07:00) (92/65 - 122/86)  BP(mean): 84 (24 Jul 2023 07:00) (74 - 96)  ABP: --  ABP(mean): --  RR: 24 (24 Jul 2023 07:00) (12 - 24)  SpO2: 100% (24 Jul 2023 07:00) (100% - 100%)    O2 Parameters below as of 24 Jul 2023 04:00  Patient On (Oxygen Delivery Method): ventilator  O2 Flow (L/min): 40          ABG - ( 23 Jul 2023 03:57 )  pH, Arterial: 7.49  pH, Blood: x     /  pCO2: 29    /  pO2: 80    / HCO3: 22    / Base Excess: -0.2  /  SaO2: 97                    07-23 @ 07:01  -  07-24 @ 07:00  --------------------------------------------------------  IN: 2303.2 mL / OUT: 3150 mL / NET: -846.8 mL        Mode: AC/ CMV (Assist Control/ Continuous Mandatory Ventilation)  RR (machine): 12  TV (machine): 400  FiO2: 40  PEEP: 5  ITime: 1  MAP: 7  PIP: 13      PHYSICAL EXAM:    GENERAL: Pt seem lying in bed, appears fatigued as she comes off sedation  HEAD:  Atraumatic, Normocephalic  EYES: EOMI, pupils responsive to light  MMM; No tonsillar erythema or exudates  NECK: Nrmal appearance, No JVD; Trachea midline; intubation tube still in place  NERVOUS SYSTEM:  Pt appears to be aware of her surroundings, will conduct more complete neural assessment once pt is fully extubated  CHEST/LUNG: No chest deformity; No rales or wheezing   HEART: Regular rate and rhythm; No abnormal heart sounds  ABDOMEN: Soft, Nontender, Nondistended; Bowel sounds present  EXTREMITIES:  2+ Peripheral Pulses, No appreciable edema  LYMPH: No lymphadenopathy noted  SKIN: No rashes or lesions; scars on left wrist      LABS:  CBC Full  -  ( 24 Jul 2023 04:47 )  WBC Count : 11.41 K/uL  RBC Count : 3.55 M/uL  Hemoglobin : 11.5 g/dL  Hematocrit : 34.6 %  Platelet Count - Automated : 177 K/uL  Mean Cell Volume : 97.5 fl  Mean Cell Hemoglobin : 32.4 pg  Mean Cell Hemoglobin Concentration : 33.2 gm/dL  Auto Neutrophil # : 8.90 K/uL  Auto Lymphocyte # : 1.49 K/uL  Auto Monocyte # : 0.91 K/uL  Auto Eosinophil # : 0.04 K/uL  Auto Basophil # : 0.03 K/uL  Auto Neutrophil % : 77.8 %  Auto Lymphocyte % : 13.1 %  Auto Monocyte % : 8.0 %  Auto Eosinophil % : 0.4 %  Auto Basophil % : 0.3 %    07-24    143  |  112<H>  |  6<L>  ----------------------------<  84  3.7   |  24  |  0.78    Ca    8.7      24 Jul 2023 04:47  Phos  3.8     07-24  Mg     2.0     07-24    TPro  7.2  /  Alb  3.7  /  TBili  0.5  /  DBili  x   /  AST  19  /  ALT  16  /  AlkPhos  58  07-24      Urinalysis Basic - ( 24 Jul 2023 04:47 )    Color: x / Appearance: x / SG: x / pH: x  Gluc: 84 mg/dL / Ketone: x  / Bili: x / Urobili: x   Blood: x / Protein: x / Nitrite: x   Leuk Esterase: x / RBC: x / WBC x   Sq Epi: x / Non Sq Epi: x / Bacteria: x      Culture Results:   No growth at 24 hours (07-23 @ 00:28)  Culture Results:   No growth at 24 hours (07-23 @ 00:25)      RADIOLOGY & ADDITIONAL STUDIES REVIEWED:      CT Chest  IMPRESSION:  1. Endotracheal tube tip entering the right mainstem bronchus. Recommend   3 cm retraction.  2. Multifocal right lung opacities compatible with aspiration pneumonia.        [x]GOALS OF CARE DISCUSSION WITH PATIENT/FAMILY/PROXY:    CRITICAL CARE TIME SPENT: 35 minutes

## 2023-07-24 NOTE — CHART NOTE - NSCHARTNOTEFT_GEN_A_CORE
I spoke with the family of the pt using [place]  ID #[id number] ( Karlo) I updated them regarding the treatment plan and answered all their questions. Family would like to speak with Dr. Land in the AM regarding inpatient psych. Primary team to follow.

## 2023-07-24 NOTE — BH CONSULTATION LIAISON ASSESSMENT NOTE - NSBHCHARTREVIEWLAB_PSY_A_CORE FT
CBC Full  -  ( 24 Jul 2023 04:47 )  WBC Count : 11.41 K/uL  RBC Count : 3.55 M/uL  Hemoglobin : 11.5 g/dL  Hematocrit : 34.6 %  Platelet Count - Automated : 177 K/uL  Mean Cell Volume : 97.5 fl  Mean Cell Hemoglobin : 32.4 pg  Mean Cell Hemoglobin Concentration : 33.2 gm/dL  Auto Neutrophil # : 8.90 K/uL  Auto Lymphocyte # : 1.49 K/uL  Auto Monocyte # : 0.91 K/uL  Auto Eosinophil # : 0.04 K/uL  Auto Basophil # : 0.03 K/uL  Auto Neutrophil % : 77.8 %  Auto Lymphocyte % : 13.1 %  Auto Monocyte % : 8.0 %  Auto Eosinophil % : 0.4 %  Auto Basophil % : 0.3 %  07-24    143  |  112<H>  |  6<L>  ----------------------------<  84  3.7   |  24  |  0.78    Ca    8.7      24 Jul 2023 04:47  Phos  3.8     07-24  Mg     2.0     07-24    TPro  7.2  /  Alb  3.7  /  TBili  0.5  /  DBili  x   /  AST  19  /  ALT  16  /  AlkPhos  58  07-24

## 2023-07-24 NOTE — PROGRESS NOTE ADULT - ASSESSMENT
Patient is a 19 yo female with no PMH presents with altered mental status after ingesting unknown amounts of diphenhydramine. Upon evaluation in ED, patient afebrile, noted to be tachycardic to 157 bpm but hemodynamically stable. Patient noted to have 3 minute generalized tonic- clonic seizure in which she was unable to protect her airway and subsequently intubated and ativan was given. Gastric lavage was done showing pill fragments. Labs significant for no leukocytosis, potassium of 2.7 with normal renal function, LFTs noted to be normal, negative cardiac enzymes. EKG showing sinus tachycardia to 135pm with t wave inversions in the inferior leads (unknown baseline), and a QTc of 184. CT head negative for acute injury. Patient admitted to ICU for Acute Encephalopathy 2/2 Suspected Diphenhydramine overdose.     Plan:  Neuro:   #Acute encephalopathy  - Likely Toxic Metabolic Encephalopthy  - patient presenting with altered mental status after ingesting unknown amounts of diphenhydramine   - UTox noted to be negative, acetaminophen and salicylates negative   - EKG noted Sinus tachyardia to 135bpm with t wave inversions in the inferior leads normal QRS and QTc currently normal   - Toxicology following, Spoke to toxicology fellow, Dr. Oreilly  - s/p ETT and barbara in ED   - s/p gastric lavage in ED, retained pill fragments recovered  - s/p Activated charcoal in ED   - c/w with propofol   - CT head: No signs of acute brain infarct or hemorrhage    # Seizures   - Patient noted with 2-3 minute one time episode of seizure, unable to protect airway subsequently intubated   - Given total 3mg Ativan in ED  - Likely 2/2 drug overdose   - Utox noted to be negative, UA noted to be negative for infection   - Negative cardiac enzymes, lactate noted to be 2.4   - f/u blood cultures r/o infectious etiologies   - Neuro checks   - c/w ativan as needed   - EEG: No epileptiform activity  - Neuro consult in AM     Cardiovascular:  # Hypokalemia   - Noted to be 2.7 > 3.4 > 3.8  - Can be 2/2 s/p gastric lavage/ GI losses   - KCl riders 10meq x3 with LR fluids   - EKG noted as above with negative cardiac enzymes   - Repeat CMP and replete electrolyte to keep K>4 and Mg>2     Pulmonary:   - No acute issues     Infectious Diseases:   # Elevated lactate  - Noted to be 2.4   - Possibly 2/2 seizures   - Afebrile with no leukocytosis   - IVF hydration   - Trend lactate  - f/u blood cultures     Gastrointestinal:  - NPO   - f/u ct abdomen and pelvis     Renal:  - No acute issues     Heme/onc:   - No acute issues     Endo:   - No acute issues     Skin/ catheter:   - Sierra catheter placed in ED  - Two peripheral line IVs in place     Prophylaxis:   - DVT prophylaxis     Dispo: ICU    Patient is a 17 yo female with no PMH presents with altered mental status after ingesting unknown amounts of diphenhydramine. Upon evaluation in ED, patient afebrile, noted to be tachycardic to 157 bpm but hemodynamically stable. Patient noted to have 3 minute generalized tonic- clonic seizure in which she was unable to protect her airway and subsequently intubated and ativan was given. Gastric lavage was done showing pill fragments. Labs have thus far been largely within normal range. Serial EKG's were done and showed QRS and QTc remaining within normal limits. CT head negative for acute injury and EEG negative for epileptiform activity, however chest CT revealed signs of aspiration. Vitals have largely remained within normal limits with the exception of a fever which began 7/23. In response, the pt was started on antibiotics. The pt otherwise remains in ICU for continued management of her Acute Encephalopathy 2/2 Suspected Diphenhydramine overdose.     Plan:  Neuro:   #Acute encephalopathy  - Likely Toxic Metabolic Encephalopthy  - Patient presented with altered mental status after ingesting unknown amounts of diphenhydramine   - UTox noted to be negative, acetaminophen and salicylates negative   - Serial EKG's noted QRS and QTc within normal range  - CT head: No signs of acute brain infarct or hemorrhage  - Toxicology following, Spoke to toxicology fellow, Dr. Oreilly  - s/p ETT and barbara in ED   - s/p gastric lavage in ED, retained pill fragments recovered  - s/p Activated charcoal in ED   - s/p successful SAT/SBT   - 1to1 monitoring  - Psych consult tomorrow      # Seizures   - Patient noted with 2-3 minute one time episode of seizure, unable to protect airway subsequently intubated   - Given total 3mg Ativan in ED  - Likely 2/2 drug overdose   - Utox noted to be negative, UA noted to be negative for infection   - Negative cardiac enzymes, lactate noted to be 2.4   - f/u blood/sputum cultures r/o infectious etiologies   - Neuro checks   - c/w ativan as needed   - EEG: No epileptiform activity  - Neuro consult    Cardiovascular:  # Hypokalemia   - Resolved  - Repeat CMP and replete electrolyte to keep K>4 and Mg>2     Pulmonary:   - Pt successfully extubated   - Good oxygen saturation ora  - Suspected aspiration pneumonia  - f/u results of chest x-ray  - c/w ceftriaxone     Infectious Diseases:  - Suspected aspiration pneumonia  - f/u results of chest x-ray  - c/w ceftriaxone  cultures   - f/u blood/sputum cultures    Gastrointestinal:  - NPO     Renal:  - No acute issues     Heme/onc:   - No acute issues     Endo:   - No acute issues     Skin/ catheter:   - Sierra catheter placed in ED  - Two peripheral line IVs in place     Prophylaxis:   - DVT prophylaxis w/ lovenox 40mg qd  - PPI prophylaxis w/ PPI 40mg qd    Dispo: ICU    Patient is a 19 yo female with no PMH presents with altered mental status after ingesting unknown amounts of diphenhydramine. Upon evaluation in ED, patient afebrile, noted to be tachycardic to 157 bpm but hemodynamically stable. Patient noted to have 3 minute generalized tonic- clonic seizure in which she was unable to protect her airway and subsequently intubated and ativan was given. Gastric lavage was done showing pill fragments. Labs have thus far been largely within normal range. Serial EKG's were done and showed QRS and QTc remaining within normal limits. CT head negative for acute injury and EEG negative for epileptiform activity, however chest CT revealed signs of aspiration. Vitals have largely remained within normal limits with the exception of a fever which began 7/23. In response, the pt was started on antibiotics. The pt otherwise remains in ICU for continued management of her Acute Encephalopathy 2/2 Suspected Diphenhydramine overdose.     Plan:  Neuro:   #Acute encephalopathy  - Likely Toxic Metabolic Encephalopathy  - Patient presented with altered mental status after ingesting unknown amounts of diphenhydramine   - UTox noted to be negative, acetaminophen and salicylates negative   - Serial EKG's noted QRS and QTc within normal range  - CT head: No signs of acute brain infarct or hemorrhage  - Toxicology following, Spoke to toxicology fellow, Dr. Oreilly  - s/p ETT and barbara in ED   - s/p gastric lavage in ED, retained pill fragments recovered  - s/p Activated charcoal in ED   - s/p successful SAT/SBT   - 1to1 monitoring  - Psych consult tomorrow      # Seizures   - Patient noted with 2-3 minute one time episode of seizure, unable to protect airway subsequently intubated   - Given total 3mg Ativan in ED  - Likely 2/2 drug overdose   - Utox noted to be negative, UA noted to be negative for infection   - Negative cardiac enzymes, lactate noted to be 2.4   - f/u blood/sputum cultures r/o infectious etiologies   - Neuro checks   - c/w ativan as needed   - EEG: No epileptiform activity  - Neuro consult    Cardiovascular:  # Hypokalemia   - Resolved  - Repeat CMP and replete electrolyte to keep K>4 and Mg>2     Pulmonary:   - Pt successfully extubated   - Good oxygen saturation ora  - Suspected aspiration pneumonia  - f/u results of chest x-ray  - c/w ceftriaxone     Infectious Diseases:  - Suspected aspiration pneumonia  - f/u results of chest x-ray  - c/w ceftriaxone  cultures   - f/u blood/sputum cultures    Gastrointestinal:  - NPO     Renal:  - No acute issues     Heme/onc:   - No acute issues     Endo:   - No acute issues     Skin/ catheter:   - Sierra catheter placed in ED  - Two peripheral line IVs in place     Prophylaxis:   - DVT prophylaxis w/ lovenox 40mg qd  - PPI prophylaxis w/ PPI 40mg qd    Dispo: ICU

## 2023-07-25 DIAGNOSIS — R56.9 UNSPECIFIED CONVULSIONS: ICD-10-CM

## 2023-07-25 DIAGNOSIS — E83.39 OTHER DISORDERS OF PHOSPHORUS METABOLISM: ICD-10-CM

## 2023-07-25 DIAGNOSIS — Z02.9 ENCOUNTER FOR ADMINISTRATIVE EXAMINATIONS, UNSPECIFIED: ICD-10-CM

## 2023-07-25 DIAGNOSIS — T50.902A POISONING BY UNSPECIFIED DRUGS, MEDICAMENTS AND BIOLOGICAL SUBSTANCES, INTENTIONAL SELF-HARM, INITIAL ENCOUNTER: ICD-10-CM

## 2023-07-25 DIAGNOSIS — G93.40 ENCEPHALOPATHY, UNSPECIFIED: ICD-10-CM

## 2023-07-25 LAB
ALBUMIN SERPL ELPH-MCNC: 3.3 G/DL — LOW (ref 3.5–5)
ALP SERPL-CCNC: 59 U/L — SIGNIFICANT CHANGE UP (ref 40–120)
ALT FLD-CCNC: 13 U/L DA — SIGNIFICANT CHANGE UP (ref 10–60)
ANION GAP SERPL CALC-SCNC: 9 MMOL/L — SIGNIFICANT CHANGE UP (ref 5–17)
AST SERPL-CCNC: 12 U/L — SIGNIFICANT CHANGE UP (ref 10–40)
BASOPHILS # BLD AUTO: 0.03 K/UL — SIGNIFICANT CHANGE UP (ref 0–0.2)
BASOPHILS NFR BLD AUTO: 0.3 % — SIGNIFICANT CHANGE UP (ref 0–2)
BILIRUB SERPL-MCNC: 0.5 MG/DL — SIGNIFICANT CHANGE UP (ref 0.2–1.2)
BUN SERPL-MCNC: 9 MG/DL — SIGNIFICANT CHANGE UP (ref 7–18)
CALCIUM SERPL-MCNC: 8.3 MG/DL — LOW (ref 8.4–10.5)
CHLORIDE SERPL-SCNC: 109 MMOL/L — HIGH (ref 96–108)
CO2 SERPL-SCNC: 23 MMOL/L — SIGNIFICANT CHANGE UP (ref 22–31)
CREAT SERPL-MCNC: 0.51 MG/DL — SIGNIFICANT CHANGE UP (ref 0.5–1.3)
EGFR: 137 ML/MIN/1.73M2 — SIGNIFICANT CHANGE UP
EOSINOPHIL # BLD AUTO: 0.11 K/UL — SIGNIFICANT CHANGE UP (ref 0–0.5)
EOSINOPHIL NFR BLD AUTO: 0.9 % — SIGNIFICANT CHANGE UP (ref 0–6)
GLUCOSE SERPL-MCNC: 98 MG/DL — SIGNIFICANT CHANGE UP (ref 70–99)
HCT VFR BLD CALC: 32.1 % — LOW (ref 34.5–45)
HGB BLD-MCNC: 10.7 G/DL — LOW (ref 11.5–15.5)
IMM GRANULOCYTES NFR BLD AUTO: 0.3 % — SIGNIFICANT CHANGE UP (ref 0–0.9)
LYMPHOCYTES # BLD AUTO: 1.54 K/UL — SIGNIFICANT CHANGE UP (ref 1–3.3)
LYMPHOCYTES # BLD AUTO: 13 % — SIGNIFICANT CHANGE UP (ref 13–44)
MAGNESIUM SERPL-MCNC: 1.9 MG/DL — SIGNIFICANT CHANGE UP (ref 1.6–2.6)
MCHC RBC-ENTMCNC: 31.9 PG — SIGNIFICANT CHANGE UP (ref 27–34)
MCHC RBC-ENTMCNC: 33.3 GM/DL — SIGNIFICANT CHANGE UP (ref 32–36)
MCV RBC AUTO: 95.8 FL — SIGNIFICANT CHANGE UP (ref 80–100)
MONOCYTES # BLD AUTO: 0.8 K/UL — SIGNIFICANT CHANGE UP (ref 0–0.9)
MONOCYTES NFR BLD AUTO: 6.7 % — SIGNIFICANT CHANGE UP (ref 2–14)
NEUTROPHILS # BLD AUTO: 9.38 K/UL — HIGH (ref 1.8–7.4)
NEUTROPHILS NFR BLD AUTO: 78.8 % — HIGH (ref 43–77)
NRBC # BLD: 0 /100 WBCS — SIGNIFICANT CHANGE UP (ref 0–0)
PHOSPHATE SERPL-MCNC: 2.4 MG/DL — LOW (ref 2.5–4.5)
PLATELET # BLD AUTO: 167 K/UL — SIGNIFICANT CHANGE UP (ref 150–400)
POTASSIUM SERPL-MCNC: 3.5 MMOL/L — SIGNIFICANT CHANGE UP (ref 3.5–5.3)
POTASSIUM SERPL-SCNC: 3.5 MMOL/L — SIGNIFICANT CHANGE UP (ref 3.5–5.3)
PROT SERPL-MCNC: 6.4 G/DL — SIGNIFICANT CHANGE UP (ref 6–8.3)
RBC # BLD: 3.35 M/UL — LOW (ref 3.8–5.2)
RBC # FLD: 12 % — SIGNIFICANT CHANGE UP (ref 10.3–14.5)
SARS-COV-2 RNA SPEC QL NAA+PROBE: SIGNIFICANT CHANGE UP
SODIUM SERPL-SCNC: 141 MMOL/L — SIGNIFICANT CHANGE UP (ref 135–145)
WBC # BLD: 11.89 K/UL — HIGH (ref 3.8–10.5)
WBC # FLD AUTO: 11.89 K/UL — HIGH (ref 3.8–10.5)

## 2023-07-25 PROCEDURE — 99233 SBSQ HOSP IP/OBS HIGH 50: CPT

## 2023-07-25 RX ORDER — SERTRALINE 25 MG/1
25 TABLET, FILM COATED ORAL DAILY
Refills: 0 | Status: DISCONTINUED | OUTPATIENT
Start: 2023-07-25 | End: 2023-07-26

## 2023-07-25 RX ORDER — SODIUM,POTASSIUM PHOSPHATES 278-250MG
1 POWDER IN PACKET (EA) ORAL ONCE
Refills: 0 | Status: COMPLETED | OUTPATIENT
Start: 2023-07-25 | End: 2023-07-25

## 2023-07-25 RX ADMIN — SERTRALINE 25 MILLIGRAM(S): 25 TABLET, FILM COATED ORAL at 12:37

## 2023-07-25 RX ADMIN — Medication 1 PACKET(S): at 12:37

## 2023-07-25 RX ADMIN — SODIUM CHLORIDE 100 MILLILITER(S): 9 INJECTION, SOLUTION INTRAVENOUS at 05:26

## 2023-07-25 RX ADMIN — PANTOPRAZOLE SODIUM 40 MILLIGRAM(S): 20 TABLET, DELAYED RELEASE ORAL at 12:37

## 2023-07-25 RX ADMIN — CEFTRIAXONE 100 MILLIGRAM(S): 500 INJECTION, POWDER, FOR SOLUTION INTRAMUSCULAR; INTRAVENOUS at 13:54

## 2023-07-25 NOTE — PROGRESS NOTE ADULT - SUBJECTIVE AND OBJECTIVE BOX
NP Note discussed with  primary attending    Patient is a 18y old  Female who presents with a chief complaint of Acute encephalopathy 2/2 Suspected drug overdose (24 Jul 2023 07:36)      INTERVAL HPI/OVERNIGHT EVENTS: no new complaints    MEDICATIONS  (STANDING):  cefTRIAXone   IVPB 1000 milliGRAM(s) IV Intermittent every 24 hours  enoxaparin Injectable 40 milliGRAM(s) SubCutaneous every 24 hours  lactated ringers. 1000 milliLiter(s) (100 mL/Hr) IV Continuous <Continuous>  pantoprazole  Injectable 40 milliGRAM(s) IV Push daily  sertraline 25 milliGRAM(s) Oral daily    MEDICATIONS  (PRN):  acetaminophen     Tablet .. 650 milliGRAM(s) Oral every 6 hours PRN Temp greater or equal to 38C (100.4F), Mild Pain (1 - 3)      __________________________________________________  REVIEW OF SYSTEMS:    CONSTITUTIONAL: No fever,   EYES: no acute visual disturbances  NECK: No pain or stiffness  RESPIRATORY: No cough; No shortness of breath  CARDIOVASCULAR: No chest pain, no palpitations  GASTROINTESTINAL: No pain. No nausea or vomiting; No diarrhea   NEUROLOGICAL: No headache or numbness, no tremors  MUSCULOSKELETAL: No joint pain, no muscle pain  GENITOURINARY: no dysuria, no frequency, no hesitancy  PSYCHIATRY: no depression , no anxiety  ALL OTHER  ROS negative        Vital Signs Last 24 Hrs  T(C): 36.9 (25 Jul 2023 12:25), Max: 38 (24 Jul 2023 18:00)  T(F): 98.4 (25 Jul 2023 12:25), Max: 100.4 (24 Jul 2023 18:00)  HR: 87 (25 Jul 2023 12:25) (76 - 93)  BP: 109/68 (25 Jul 2023 12:25) (109/68 - 117/87)  BP(mean): 86 (24 Jul 2023 20:00) (86 - 96)  RR: 16 (25 Jul 2023 12:25) (16 - 23)  SpO2: 98% (25 Jul 2023 12:25) (98% - 100%)    Parameters below as of 25 Jul 2023 12:25  Patient On (Oxygen Delivery Method): room air        ________________________________________________  PHYSICAL EXAM:  GENERAL: NAD  HEENT: Normocephalic;  conjunctivae and sclerae clear; moist mucous membranes;   NECK : supple  CHEST/LUNG: Clear to ausculitation bilaterally with good air entry   HEART: S1 S2  regular; no murmurs, gallops or rubs  ABDOMEN: Soft, Nontender, Nondistended; Bowel sounds present  EXTREMITIES: no cyanosis; no edema; no calf tenderness  SKIN: warm and dry; no rash  NERVOUS SYSTEM:  Awake and alert; Oriented  to place, person and time ; no new deficits    _________________________________________________  LABS:                        10.7   11.89 )-----------( 167      ( 25 Jul 2023 06:15 )             32.1     07-25    141  |  109<H>  |  9   ----------------------------<  98  3.5   |  23  |  0.51    Ca    8.3<L>      25 Jul 2023 06:15  Phos  2.4     07-25  Mg     1.9     07-25    TPro  6.4  /  Alb  3.3<L>  /  TBili  0.5  /  DBili  x   /  AST  12  /  ALT  13  /  AlkPhos  59  07-25      Urinalysis Basic - ( 25 Jul 2023 06:15 )    Color: x / Appearance: x / SG: x / pH: x  Gluc: 98 mg/dL / Ketone: x  / Bili: x / Urobili: x   Blood: x / Protein: x / Nitrite: x   Leuk Esterase: x / RBC: x / WBC x   Sq Epi: x / Non Sq Epi: x / Bacteria: x      CAPILLARY BLOOD GLUCOSE            RADIOLOGY & ADDITIONAL TESTS:    Imaging Personally Reviewed:  YES/NO    Consultant(s) Notes Reviewed:   YES/ No    Care Discussed with Consultants :     Plan of care was discussed with patient and /or primary care giver; all questions and concerns were addressed and care was aligned with patient's wishes.     NP Note discussed with  primary attending    Patient is a 18y old  Female who presents with a chief complaint of Acute encephalopathy 2/2 Suspected drug overdose (24 Jul 2023 07:36)      INTERVAL HPI/OVERNIGHT EVENTS: no new complaints, continues to be on one to one observation. Patient continues to verbalized that she wants to be ''free''     MEDICATIONS  (STANDING):  cefTRIAXone   IVPB 1000 milliGRAM(s) IV Intermittent every 24 hours  enoxaparin Injectable 40 milliGRAM(s) SubCutaneous every 24 hours  lactated ringers. 1000 milliLiter(s) (100 mL/Hr) IV Continuous <Continuous>  pantoprazole  Injectable 40 milliGRAM(s) IV Push daily  sertraline 25 milliGRAM(s) Oral daily    MEDICATIONS  (PRN):  acetaminophen     Tablet .. 650 milliGRAM(s) Oral every 6 hours PRN Temp greater or equal to 38C (100.4F), Mild Pain (1 - 3)      __________________________________________________  REVIEW OF SYSTEMS:    CONSTITUTIONAL: No fever,   EYES: no acute visual disturbances  NECK: No pain or stiffness  RESPIRATORY: No cough; No shortness of breath  CARDIOVASCULAR: No chest pain, no palpitations  GASTROINTESTINAL: No pain. No nausea or vomiting; No diarrhea   NEUROLOGICAL: No headache or numbness, no tremors  MUSCULOSKELETAL: No joint pain, no muscle pain  GENITOURINARY: no dysuria, no frequency, no hesitancy  PSYCHIATRY: no depression , no anxiety  ALL OTHER  ROS negative        Vital Signs Last 24 Hrs  T(C): 36.9 (25 Jul 2023 12:25), Max: 38 (24 Jul 2023 18:00)  T(F): 98.4 (25 Jul 2023 12:25), Max: 100.4 (24 Jul 2023 18:00)  HR: 87 (25 Jul 2023 12:25) (76 - 93)  BP: 109/68 (25 Jul 2023 12:25) (109/68 - 117/87)  BP(mean): 86 (24 Jul 2023 20:00) (86 - 96)  RR: 16 (25 Jul 2023 12:25) (16 - 23)  SpO2: 98% (25 Jul 2023 12:25) (98% - 100%)    Parameters below as of 25 Jul 2023 12:25  Patient On (Oxygen Delivery Method): room air        ________________________________________________  PHYSICAL EXAM:  GENERAL: NAD  HEENT: Normocephalic;  conjunctivae and sclerae clear; moist mucous membranes;   NECK : supple  CHEST/LUNG: Clear to ausculitation bilaterally with good air entry   HEART: S1 S2  regular; no murmurs, gallops or rubs  ABDOMEN: Soft, Nontender, Nondistended; Bowel sounds present  EXTREMITIES: no cyanosis; no edema; no calf tenderness  SKIN: warm and dry; no rash  NERVOUS SYSTEM:  Awake and alert; Oriented  to place, person and time ; no new deficits    _________________________________________________  LABS:                        10.7   11.89 )-----------( 167      ( 25 Jul 2023 06:15 )             32.1     07-25    141  |  109<H>  |  9   ----------------------------<  98  3.5   |  23  |  0.51    Ca    8.3<L>      25 Jul 2023 06:15  Phos  2.4     07-25  Mg     1.9     07-25    TPro  6.4  /  Alb  3.3<L>  /  TBili  0.5  /  DBili  x   /  AST  12  /  ALT  13  /  AlkPhos  59  07-25      Urinalysis Basic - ( 25 Jul 2023 06:15 )    Color: x / Appearance: x / SG: x / pH: x  Gluc: 98 mg/dL / Ketone: x  / Bili: x / Urobili: x   Blood: x / Protein: x / Nitrite: x   Leuk Esterase: x / RBC: x / WBC x   Sq Epi: x / Non Sq Epi: x / Bacteria: x      CAPILLARY BLOOD GLUCOSE            RADIOLOGY & ADDITIONAL TESTS:  < from: Xray Chest 1 View- PORTABLE-Urgent (Xray Chest 1 View- PORTABLE-Urgent .) (07.24.23 @ 13:57) >    Single frontal view of the chest demonstrates the lungs to be clear. The   cardiomediastinal silhouette is normal. No acute osseous abnormalities.   Overlying EKG leads and wires are noted    IMPRESSION: No acute cardiopulmonary disease process.    < end of copied text >      Imaging Personally Reviewed:  YES/NO    Consultant(s) Notes Reviewed:   YES/ No    Care Discussed with Consultants :     Plan of care was discussed with patient and /or primary care giver; all questions and concerns were addressed and care was aligned with patient's wishes.

## 2023-07-25 NOTE — BH CONSULTATION LIAISON PROGRESS NOTE - NSBHASSESSMENTFT_PSY_ALL_CORE
17 y/o F with no past hx, who is admitted due after an OD with Benadryl. As per chart, she suffered from a seizure and required endotracheal intubation and an ICU admission. She is followed after a suicide attempt. Patient with severe depressive symptoms and a suicidal attempt by medication overdose as well as parasuicidal behavior by cutting her wrists. Will need an inpatient psychiatric hospitalization for stabilization and observation. She is not homicidal or psychotic.    -Admit to an inpatient psychiatric unit upon medical clearance  -Cont 1:1  -Consider starting Zoloft 25 mg PO  -PRN: Haldol 1/Ativan 1/Benadryl 25 mg IM q 8 hrs for agitation  -SW f/u  -Case discussed with the primary team  -Will follow as needed

## 2023-07-25 NOTE — PROGRESS NOTE ADULT - PROBLEM SELECTOR PLAN 1
Suicide attempt after injecting half a bottle of benadryl   - UTox noted to be negative, acetaminophen and salicylates negative   - Serial EKG's noted QRS and QTc within normal range  - CT head: No signs of acute brain infarct or hemorrhage  - s/p ETT and jimenez in ED   - s/p gastric lavage in ED, retained pill fragments recovered  - s/p Activated charcoal in ED   - s/p successful SAT/SBT   - 1to1 monitoring  - Zoloft 25 mg daily   - inpatient psych Suicide attempt after injecting half a bottle of benadryl   - UTox noted to be negative, acetaminophen and salicylates negative   - Serial EKG's noted QRS and QTc within normal range  - CT head: No signs of acute brain infarct or hemorrhage  - s/p ETT and jimenez   - s/p gastric lavage in ED, retained pill fragments recovered  - s/p Activated charcoal   - s/p successful SAT/SBT   - 1to1 monitoring  - Zoloft 25 mg daily   - inpatient psych

## 2023-07-25 NOTE — BH CONSULTATION LIAISON PROGRESS NOTE - NSBHFUPMEDSE_PSY_A_CORE
RN spoke with patient who said she wants to see her PCP Dr. Sergio Adam first prior to f/u with cardiology. Pt has appt to see Dr. Sergio Adam on 6/5/23. She will call cardiology if PCP orders any cardiac testing. None known

## 2023-07-25 NOTE — BH CONSULTATION LIAISON PROGRESS NOTE - NSBHFUPINTERVALHXFT_PSY_A_CORE
Patient seen and chart reviewed. Patient transferred from the ICU to a regular rodríguez. Reports that she has been feeling very depressed since 2020, but that for the past few weeks her symptoms have been worse. Says that the combination of her parent's divorce, as well as her work and college pressures, led to her to try to kill herself. Reports feeling hopeless, worthless and guilty. Is vague when asked about current SI. Denies HI or AVH.

## 2023-07-25 NOTE — BH CONSULTATION LIAISON PROGRESS NOTE - NSBHCHARTREVIEWLAB_PSY_A_CORE FT
CBC Full  -  ( 25 Jul 2023 06:15 )  WBC Count : 11.89 K/uL  RBC Count : 3.35 M/uL  Hemoglobin : 10.7 g/dL  Hematocrit : 32.1 %  Platelet Count - Automated : 167 K/uL  Mean Cell Volume : 95.8 fl  Mean Cell Hemoglobin : 31.9 pg  Mean Cell Hemoglobin Concentration : 33.3 gm/dL  Auto Neutrophil # : 9.38 K/uL  Auto Lymphocyte # : 1.54 K/uL  Auto Monocyte # : 0.80 K/uL  Auto Eosinophil # : 0.11 K/uL  Auto Basophil # : 0.03 K/uL  Auto Neutrophil % : 78.8 %  Auto Lymphocyte % : 13.0 %  Auto Monocyte % : 6.7 %  Auto Eosinophil % : 0.9 %  Auto Basophil % : 0.3 %  07-25    141  |  109<H>  |  9   ----------------------------<  98  3.5   |  23  |  0.51    Ca    8.3<L>      25 Jul 2023 06:15  Phos  2.4     07-25  Mg     1.9     07-25    TPro  6.4  /  Alb  3.3<L>  /  TBili  0.5  /  DBili  x   /  AST  12  /  ALT  13  /  AlkPhos  59  07-25

## 2023-07-25 NOTE — BH CONSULTATION LIAISON PROGRESS NOTE - NSBHCHARTREVIEWVS_PSY_A_CORE FT
Vital Signs Last 24 Hrs  T(C): 36.9 (25 Jul 2023 12:25), Max: 38 (24 Jul 2023 18:00)  T(F): 98.4 (25 Jul 2023 12:25), Max: 100.4 (24 Jul 2023 18:00)  HR: 87 (25 Jul 2023 12:25) (76 - 93)  BP: 109/68 (25 Jul 2023 12:25) (109/68 - 117/87)  BP(mean): 86 (24 Jul 2023 20:00) (86 - 96)  RR: 16 (25 Jul 2023 12:25) (16 - 23)  SpO2: 98% (25 Jul 2023 12:25) (98% - 100%)    Parameters below as of 25 Jul 2023 12:25  Patient On (Oxygen Delivery Method): room air
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
## 2023-07-25 NOTE — BH CONSULTATION LIAISON PROGRESS NOTE - CURRENT MEDICATION
MEDICATIONS  (STANDING):  cefTRIAXone   IVPB 1000 milliGRAM(s) IV Intermittent every 24 hours  enoxaparin Injectable 40 milliGRAM(s) SubCutaneous every 24 hours  lactated ringers. 1000 milliLiter(s) (100 mL/Hr) IV Continuous <Continuous>  pantoprazole  Injectable 40 milliGRAM(s) IV Push daily  sertraline 25 milliGRAM(s) Oral daily    MEDICATIONS  (PRN):  acetaminophen     Tablet .. 650 milliGRAM(s) Oral every 6 hours PRN Temp greater or equal to 38C (100.4F), Mild Pain (1 - 3)

## 2023-07-26 ENCOUNTER — INPATIENT (INPATIENT)
Facility: HOSPITAL | Age: 18
LOS: 13 days | Discharge: HOME | End: 2023-08-09
Attending: PSYCHIATRY & NEUROLOGY | Admitting: PSYCHIATRY & NEUROLOGY
Payer: MEDICAID

## 2023-07-26 VITALS — TEMPERATURE: 100 F | OXYGEN SATURATION: 100 % | RESPIRATION RATE: 16 BRPM | WEIGHT: 119.93 LBS

## 2023-07-26 VITALS
SYSTOLIC BLOOD PRESSURE: 113 MMHG | OXYGEN SATURATION: 96 % | RESPIRATION RATE: 18 BRPM | HEART RATE: 100 BPM | DIASTOLIC BLOOD PRESSURE: 78 MMHG | TEMPERATURE: 99 F

## 2023-07-26 DIAGNOSIS — F33.9 MAJOR DEPRESSIVE DISORDER, RECURRENT, UNSPECIFIED: ICD-10-CM

## 2023-07-26 PROBLEM — Z78.9 OTHER SPECIFIED HEALTH STATUS: Chronic | Status: ACTIVE | Noted: 2023-07-23

## 2023-07-26 LAB
-  AMIKACIN: SIGNIFICANT CHANGE UP
-  AMOXICILLIN/CLAVULANIC ACID: SIGNIFICANT CHANGE UP
-  AMPICILLIN/SULBACTAM: SIGNIFICANT CHANGE UP
-  AMPICILLIN/SULBACTAM: SIGNIFICANT CHANGE UP
-  AMPICILLIN: SIGNIFICANT CHANGE UP
-  AZTREONAM: SIGNIFICANT CHANGE UP
-  CEFAZOLIN: SIGNIFICANT CHANGE UP
-  CEFAZOLIN: SIGNIFICANT CHANGE UP
-  CEFEPIME: SIGNIFICANT CHANGE UP
-  CEFOXITIN: SIGNIFICANT CHANGE UP
-  CEFTRIAXONE: SIGNIFICANT CHANGE UP
-  CIPROFLOXACIN: SIGNIFICANT CHANGE UP
-  CLINDAMYCIN: SIGNIFICANT CHANGE UP
-  ERTAPENEM: SIGNIFICANT CHANGE UP
-  ERYTHROMYCIN: SIGNIFICANT CHANGE UP
-  GENTAMICIN: SIGNIFICANT CHANGE UP
-  GENTAMICIN: SIGNIFICANT CHANGE UP
-  IMIPENEM: SIGNIFICANT CHANGE UP
-  LEVOFLOXACIN: SIGNIFICANT CHANGE UP
-  MEROPENEM: SIGNIFICANT CHANGE UP
-  OXACILLIN: SIGNIFICANT CHANGE UP
-  PIPERACILLIN/TAZOBACTAM: SIGNIFICANT CHANGE UP
-  RIFAMPIN: SIGNIFICANT CHANGE UP
-  TETRACYCLINE: SIGNIFICANT CHANGE UP
-  TOBRAMYCIN: SIGNIFICANT CHANGE UP
-  TRIMETHOPRIM/SULFAMETHOXAZOLE: SIGNIFICANT CHANGE UP
-  TRIMETHOPRIM/SULFAMETHOXAZOLE: SIGNIFICANT CHANGE UP
-  VANCOMYCIN: SIGNIFICANT CHANGE UP
ALBUMIN SERPL ELPH-MCNC: 3.6 G/DL — SIGNIFICANT CHANGE UP (ref 3.5–5)
ALP SERPL-CCNC: 62 U/L — SIGNIFICANT CHANGE UP (ref 40–120)
ALT FLD-CCNC: 15 U/L DA — SIGNIFICANT CHANGE UP (ref 10–60)
ANION GAP SERPL CALC-SCNC: 11 MMOL/L — SIGNIFICANT CHANGE UP (ref 5–17)
AST SERPL-CCNC: 15 U/L — SIGNIFICANT CHANGE UP (ref 10–40)
BILIRUB SERPL-MCNC: 0.6 MG/DL — SIGNIFICANT CHANGE UP (ref 0.2–1.2)
BUN SERPL-MCNC: 8 MG/DL — SIGNIFICANT CHANGE UP (ref 7–18)
CALCIUM SERPL-MCNC: 9.2 MG/DL — SIGNIFICANT CHANGE UP (ref 8.4–10.5)
CHLORIDE SERPL-SCNC: 105 MMOL/L — SIGNIFICANT CHANGE UP (ref 96–108)
CO2 SERPL-SCNC: 23 MMOL/L — SIGNIFICANT CHANGE UP (ref 22–31)
CREAT SERPL-MCNC: 0.43 MG/DL — LOW (ref 0.5–1.3)
CULTURE RESULTS: SIGNIFICANT CHANGE UP
EGFR: 143 ML/MIN/1.73M2 — SIGNIFICANT CHANGE UP
GLUCOSE SERPL-MCNC: 73 MG/DL — SIGNIFICANT CHANGE UP (ref 70–99)
HCT VFR BLD CALC: 32 % — LOW (ref 34.5–45)
HGB BLD-MCNC: 10.6 G/DL — LOW (ref 11.5–15.5)
MCHC RBC-ENTMCNC: 31.7 PG — SIGNIFICANT CHANGE UP (ref 27–34)
MCHC RBC-ENTMCNC: 33.1 GM/DL — SIGNIFICANT CHANGE UP (ref 32–36)
MCV RBC AUTO: 95.8 FL — SIGNIFICANT CHANGE UP (ref 80–100)
METHOD TYPE: SIGNIFICANT CHANGE UP
METHOD TYPE: SIGNIFICANT CHANGE UP
NRBC # BLD: 0 /100 WBCS — SIGNIFICANT CHANGE UP (ref 0–0)
ORGANISM # SPEC MICROSCOPIC CNT: SIGNIFICANT CHANGE UP
PLATELET # BLD AUTO: 184 K/UL — SIGNIFICANT CHANGE UP (ref 150–400)
POTASSIUM SERPL-MCNC: 3.6 MMOL/L — SIGNIFICANT CHANGE UP (ref 3.5–5.3)
POTASSIUM SERPL-SCNC: 3.6 MMOL/L — SIGNIFICANT CHANGE UP (ref 3.5–5.3)
PROT SERPL-MCNC: 6.9 G/DL — SIGNIFICANT CHANGE UP (ref 6–8.3)
RBC # BLD: 3.34 M/UL — LOW (ref 3.8–5.2)
RBC # FLD: 11.6 % — SIGNIFICANT CHANGE UP (ref 10.3–14.5)
SODIUM SERPL-SCNC: 139 MMOL/L — SIGNIFICANT CHANGE UP (ref 135–145)
SPECIMEN SOURCE: SIGNIFICANT CHANGE UP
WBC # BLD: 9.5 K/UL — SIGNIFICANT CHANGE UP (ref 3.8–10.5)
WBC # FLD AUTO: 9.5 K/UL — SIGNIFICANT CHANGE UP (ref 3.8–10.5)

## 2023-07-26 PROCEDURE — 84478 ASSAY OF TRIGLYCERIDES: CPT

## 2023-07-26 PROCEDURE — 80048 BASIC METABOLIC PNL TOTAL CA: CPT

## 2023-07-26 PROCEDURE — 93005 ELECTROCARDIOGRAM TRACING: CPT

## 2023-07-26 PROCEDURE — 87040 BLOOD CULTURE FOR BACTERIA: CPT

## 2023-07-26 PROCEDURE — 99285 EMERGENCY DEPT VISIT HI MDM: CPT | Mod: 25

## 2023-07-26 PROCEDURE — 85027 COMPLETE CBC AUTOMATED: CPT

## 2023-07-26 PROCEDURE — 96361 HYDRATE IV INFUSION ADD-ON: CPT

## 2023-07-26 PROCEDURE — 95957 EEG DIGITAL ANALYSIS: CPT

## 2023-07-26 PROCEDURE — 82962 GLUCOSE BLOOD TEST: CPT

## 2023-07-26 PROCEDURE — 83605 ASSAY OF LACTIC ACID: CPT

## 2023-07-26 PROCEDURE — 94002 VENT MGMT INPAT INIT DAY: CPT

## 2023-07-26 PROCEDURE — 82550 ASSAY OF CK (CPK): CPT

## 2023-07-26 PROCEDURE — 99222 1ST HOSP IP/OBS MODERATE 55: CPT

## 2023-07-26 PROCEDURE — 85025 COMPLETE CBC W/AUTO DIFF WBC: CPT

## 2023-07-26 PROCEDURE — 87077 CULTURE AEROBIC IDENTIFY: CPT

## 2023-07-26 PROCEDURE — 87186 SC STD MICRODIL/AGAR DIL: CPT

## 2023-07-26 PROCEDURE — 36415 COLL VENOUS BLD VENIPUNCTURE: CPT

## 2023-07-26 PROCEDURE — 84702 CHORIONIC GONADOTROPIN TEST: CPT

## 2023-07-26 PROCEDURE — 81232 DPYD GENE COMMON VARIANTS: CPT

## 2023-07-26 PROCEDURE — 70450 CT HEAD/BRAIN W/O DYE: CPT | Mod: MA

## 2023-07-26 PROCEDURE — 84443 ASSAY THYROID STIM HORMONE: CPT

## 2023-07-26 PROCEDURE — 81001 URINALYSIS AUTO W/SCOPE: CPT

## 2023-07-26 PROCEDURE — 84100 ASSAY OF PHOSPHORUS: CPT

## 2023-07-26 PROCEDURE — 96374 THER/PROPH/DIAG INJ IV PUSH: CPT

## 2023-07-26 PROCEDURE — 83735 ASSAY OF MAGNESIUM: CPT

## 2023-07-26 PROCEDURE — 74176 CT ABD & PELVIS W/O CONTRAST: CPT

## 2023-07-26 PROCEDURE — 84484 ASSAY OF TROPONIN QUANT: CPT

## 2023-07-26 PROCEDURE — 87641 MR-STAPH DNA AMP PROBE: CPT

## 2023-07-26 PROCEDURE — 94003 VENT MGMT INPAT SUBQ DAY: CPT

## 2023-07-26 PROCEDURE — 80307 DRUG TEST PRSMV CHEM ANLYZR: CPT

## 2023-07-26 PROCEDURE — 99239 HOSP IP/OBS DSCHRG MGMT >30: CPT

## 2023-07-26 PROCEDURE — 71045 X-RAY EXAM CHEST 1 VIEW: CPT

## 2023-07-26 PROCEDURE — 74018 RADEX ABDOMEN 1 VIEW: CPT

## 2023-07-26 PROCEDURE — 82803 BLOOD GASES ANY COMBINATION: CPT

## 2023-07-26 PROCEDURE — 96375 TX/PRO/DX INJ NEW DRUG ADDON: CPT

## 2023-07-26 PROCEDURE — 87635 SARS-COV-2 COVID-19 AMP PRB: CPT

## 2023-07-26 PROCEDURE — 87070 CULTURE OTHR SPECIMN AEROBIC: CPT

## 2023-07-26 PROCEDURE — 71250 CT THORAX DX C-: CPT

## 2023-07-26 PROCEDURE — 80053 COMPREHEN METABOLIC PANEL: CPT

## 2023-07-26 PROCEDURE — 87640 STAPH A DNA AMP PROBE: CPT

## 2023-07-26 PROCEDURE — 95816 EEG AWAKE AND DROWSY: CPT

## 2023-07-26 RX ORDER — SERTRALINE 25 MG/1
1 TABLET, FILM COATED ORAL
Qty: 0 | Refills: 0 | DISCHARGE
Start: 2023-07-26

## 2023-07-26 RX ORDER — BENZOCAINE AND MENTHOL 5; 1 G/100ML; G/100ML
1 LIQUID ORAL
Refills: 0 | Status: DISCONTINUED | OUTPATIENT
Start: 2023-07-26 | End: 2023-07-26

## 2023-07-26 RX ORDER — ACETAMINOPHEN 500 MG
2 TABLET ORAL
Qty: 0 | Refills: 0 | DISCHARGE
Start: 2023-07-26

## 2023-07-26 RX ORDER — SALIVA SUBSTITUTE COMB NO.11 351 MG
5 POWDER IN PACKET (EA) MUCOUS MEMBRANE THREE TIMES A DAY
Refills: 0 | Status: DISCONTINUED | OUTPATIENT
Start: 2023-07-26 | End: 2023-08-09

## 2023-07-26 RX ORDER — BENZOCAINE AND MENTHOL 5; 1 G/100ML; G/100ML
1 LIQUID ORAL
Refills: 0 | Status: DISCONTINUED | OUTPATIENT
Start: 2023-07-26 | End: 2023-08-09

## 2023-07-26 RX ORDER — ACETAMINOPHEN 500 MG
650 TABLET ORAL EVERY 6 HOURS
Refills: 0 | Status: DISCONTINUED | OUTPATIENT
Start: 2023-07-26 | End: 2023-08-09

## 2023-07-26 RX ORDER — LANOLIN ALCOHOL/MO/W.PET/CERES
5 CREAM (GRAM) TOPICAL AT BEDTIME
Refills: 0 | Status: DISCONTINUED | OUTPATIENT
Start: 2023-07-26 | End: 2023-07-28

## 2023-07-26 RX ORDER — ACETAMINOPHEN 500 MG
650 TABLET ORAL EVERY 6 HOURS
Refills: 0 | Status: DISCONTINUED | OUTPATIENT
Start: 2023-07-26 | End: 2023-07-26

## 2023-07-26 RX ORDER — SERTRALINE 25 MG/1
25 TABLET, FILM COATED ORAL DAILY
Refills: 0 | Status: DISCONTINUED | OUTPATIENT
Start: 2023-07-26 | End: 2023-07-27

## 2023-07-26 RX ORDER — MAGNESIUM HYDROXIDE 400 MG/1
30 TABLET, CHEWABLE ORAL DAILY
Refills: 0 | Status: DISCONTINUED | OUTPATIENT
Start: 2023-07-26 | End: 2023-08-09

## 2023-07-26 RX ADMIN — Medication 1 TABLET(S): at 17:25

## 2023-07-26 RX ADMIN — ENOXAPARIN SODIUM 40 MILLIGRAM(S): 100 INJECTION SUBCUTANEOUS at 05:34

## 2023-07-26 RX ADMIN — BENZOCAINE AND MENTHOL 1 LOZENGE: 5; 1 LIQUID ORAL at 14:48

## 2023-07-26 RX ADMIN — SERTRALINE 25 MILLIGRAM(S): 25 TABLET, FILM COATED ORAL at 12:34

## 2023-07-26 NOTE — PROGRESS NOTE ADULT - PROBLEM SELECTOR PLAN 1
Suicide attempt after injecting half a bottle of benadryl   - UTox noted to be negative, acetaminophen and salicylates negative   - Serial EKG's noted QRS and QTc within normal range  - CT head: No signs of acute brain infarct or hemorrhage  - s/p intubated, gastric lavage,  and Activated charcoal   - cot CO   - cont Zoloft 25 mg daily   - Pending bed for inpatient psych at [facility]

## 2023-07-26 NOTE — DISCHARGE NOTE PROVIDER - PROVIDER TOKENS
PROVIDER:[TOKEN:[[id number]],FOLLOWUP:[1 week]],FREE:[LAST:[na],FIRST:[na],PHONE:[(   )    -],FAX:[(   )    -],ADDRESS:[Follow up with Psychiatrist at facility],FOLLOWUP:[1-3 days]]

## 2023-07-26 NOTE — PROGRESS NOTE ADULT - NS ATTEND AMEND GEN_ALL_CORE FT
#Acute metabolic encephalopathy 2/2 medication overdose  #Suicidal attempt with medication overdose   #Seizure episode 2/2 medication overdose   #Aspiration pneumonia   #Hypophosphatemia    18yF with no significant past medical history, who presented to the ED after being found unresponsive by her family next to a bottle of diphenhydramine at home. In the ED, patient had a tonic-clonic seizure, requiring intubation for airway protection, and admitted to the ICU. Patient stabilized and downgraded to medical floor on 07/24. She has remained afebrile and hemodynamically stable.     -s/p gastric lavage, revealed pill fragments   -EEG negative for any acute epileptiform findings   -CT head unremarkable   -No evidence of QTc prolongation based on serial EKG findings   -s/p IVF, now tolerating diet  -CT chest showed evidence of aspiration pneumonia, started on ceftriaxone, de-escalate to Augmentin BID x5 more days to complete 7 day treatment course  -Patient seen by psychiatry, with recommendation for admission to inpatient psych unit; 2PC completed  -Started on Zoloft 25mg QD this admission  -DVT ppx: Lovenox
#Acute metabolic encephalopathy 2/2 medication overdose  #Suicidal attempt with medication overdose   #Seizure episode 2/2 medication overdose   #Aspiration pneumonia     18yF with no significant past medical history, who presented to the ED after being found unresponsive by her family next to a bottle of diphenhydramine at home. In the ED, patient had a tonic-clonic seizure, requiring intubation for airway protection, and admitted to the ICU. Patient stabilized and downgraded to medical floor on 07/24. She has remained afebrile and hemodynamically stable.   Patient seen at bedside. States she feels better, ambulating, and slightly improved mood. Anticipating transfer.     -s/p gastric lavage, revealed pill fragments   -EEG negative for any acute epileptiform findings   -CT head unremarkable   -No evidence of QTc prolongation based on serial EKG findings; confirmed with repeat EKG today   -s/p IVF, now tolerating diet  -CT chest showed evidence of aspiration pneumonia, started on ceftriaxone, Augmentin BID x5 days  -Patient seen by psychiatry, with recommendation for admission to inpatient psych unit  -Continue Zoloft 25mg QD  -DVT ppx: Lovenox  -Dispo: planned for transfer today

## 2023-07-26 NOTE — DISCHARGE NOTE PROVIDER - NSDCCPCAREPLAN_GEN_ALL_CORE_FT
PRINCIPAL DISCHARGE DIAGNOSIS  Diagnosis: Toxic metabolic encephalopathy  Assessment and Plan of Treatment: you were sent to the hospital because you were found unconcious at home which was likely due to Benadryl overdose. you had an episode of seizure in ED and in order to protect your breathing you were intubated (breathing tube) and were attached to a ventilator. you were monitored closely in ICU and your stomach was also lavaged.  your mental status slowly improived and you were taken off the ventilator   You were evaluated by Psychiatrist and given your suicidal thoughts and attempt you are recommended for inpatient [facility] transfer   Continue medications as prescribed   Follow up with Psychiatrist at facility      SECONDARY DISCHARGE DIAGNOSES  Diagnosis: Seizures  Assessment and Plan of Treatment: you had episode of seizure in the hospital which was likely due to Benadryl overdose, EEG of brain was done which was normal    Diagnosis: Intentional diphenhydramine overdose  Assessment and Plan of Treatment: Refer to problem #1 for additional information     PRINCIPAL DISCHARGE DIAGNOSIS  Diagnosis: Toxic metabolic encephalopathy  Assessment and Plan of Treatment: You were sent to the hospital because you were found unconcious at home which was likely due to Benadryl overdose. You had an episode of seizure in ED and in order to protect your breathing you were intubated (breathing tube) and were attached to a ventilator. You were monitored closely in ICU and your stomach was also lavaged.  Your mental status slowly improived and you were taken off the ventilator   You were evaluated by psychiatrist and given your suicidal thoughts and attempt you are recommended for inpatient Psych transfer   Continue medications as prescribed   Follow up with Psychiatrist at facility      SECONDARY DISCHARGE DIAGNOSES  Diagnosis: Intentional diphenhydramine overdose  Assessment and Plan of Treatment: Refer to problem #1 for additional information    Diagnosis: Seizures  Assessment and Plan of Treatment: you had episode of seizure in the hospital which was likely due to Benadryl overdose, EEG of brain was done which was normal

## 2023-07-26 NOTE — DISCHARGE NOTE PROVIDER - ATTENDING DISCHARGE PHYSICAL EXAMINATION:
Constitutional/General: Well developed, vitals reviewed  EYE: Symmetrical pupils, conjunctiva clear   ENT: Good dentition, oropharynx clear  NECK: No visual masses, no JVD  CHEST: No respiratory distress, bilateral symmetrical chest rise  ABDOMEN: Nondistended, no visual masses  SKIN: No rash, warm, dry  NEURO: A+Ox3, Cranial nerves grossly intact, moves all extremities, follows commands  PSYCH: Normal mood, normal affect Constitutional/General: Well developed, vitals reviewed  EYE: Symmetrical pupils, conjunctiva clear   ENT: Good dentition, oropharynx clear  NECK: No visual masses, no JVD  CHEST: No respiratory distress, bilateral symmetrical chest rise  ABDOMEN: Nondistended, no visual masses  SKIN: No rash, warm, dry; superficial cuts on wrist  NEURO: A+Ox3, Cranial nerves grossly intact, moves all extremities, follows commands  PSYCH: Normal mood, normal affect

## 2023-07-26 NOTE — PROGRESS NOTE ADULT - SUBJECTIVE AND OBJECTIVE BOX
Patient is a 18y old  Female who presents with a chief complaint of Acute encephalopathy 2/2 Suspected drug overdose (25 Jul 2023 15:45)      INTERVAL HPI/OVERNIGHT EVENTS:        REVIEW OF SYSTEMS:  CONSTITUTIONAL: No fever, chills  ENMT:  No difficulty hearing, no change in vision  NECK: No pain or stiffness  RESPIRATORY: No cough, SOB  CARDIOVASCULAR: No chest pain, palpitations  GASTROINTESTINAL: No abdominal pain. No nausea, vomiting, or diarrhea  GENITOURINARY: No dysuria  NEUROLOGICAL: No HA  SKIN: No itching, burning, rashes, or lesions   LYMPH NODES: No enlarged glands  ENDOCRINE: No heat or cold intolerance; No hair loss  MUSCULOSKELETAL: No joint pain or swelling; No muscle, back, or extremity pain  PSYCHIATRIC: No depression, anxiety  HEME/LYMPH: No easy bruising, or bleeding gums    T(C): 36.7 (07-26-23 @ 05:10), Max: 36.9 (07-25-23 @ 12:25)  HR: 77 (07-26-23 @ 05:10) (77 - 87)  BP: 107/63 (07-26-23 @ 05:10) (98/62 - 109/68)  RR: 18 (07-26-23 @ 05:10) (16 - 18)  SpO2: 98% (07-26-23 @ 05:10) (96% - 98%)  Wt(kg): --Vital Signs Last 24 Hrs  T(C): 36.7 (26 Jul 2023 05:10), Max: 36.9 (25 Jul 2023 12:25)  T(F): 98 (26 Jul 2023 05:10), Max: 98.4 (25 Jul 2023 12:25)  HR: 77 (26 Jul 2023 05:10) (77 - 87)  BP: 107/63 (26 Jul 2023 05:10) (98/62 - 109/68)  BP(mean): --  RR: 18 (26 Jul 2023 05:10) (16 - 18)  SpO2: 98% (26 Jul 2023 05:10) (96% - 98%)    Parameters below as of 26 Jul 2023 05:10  Patient On (Oxygen Delivery Method): room air        PHYSICAL EXAM:  GENERAL: NAD  EYES: clear conjunctiva; EOMI  ENMT: Moist mucous membranes  NECK: Supple, No JVD, Normal thyroid  CHEST/LUNG: Clear to auscultation bilaterally; No rales, rhonchi, wheezing, or rubs  HEART: S1, S2, Regular rate and rhythm  ABDOMEN: Soft, Nontender, Nondistended; Bowel sounds present  NEURO: Alert & Oriented X3  EXTREMITIES: No LE edema, no calf tenderness  LYMPH: No lymphadenopathy noted  SKIN: No rashes or lesions    Consultant(s) Notes Reviewed:  [x ] YES  [ ] NO  Care Discussed with Consultants/Other Providers [ x] YES  [ ] NO    LABS:                        10.6   9.50  )-----------( 184      ( 26 Jul 2023 05:45 )             32.0     07-26    139  |  105  |  8   ----------------------------<  73  3.6   |  23  |  0.43<L>    Ca    9.2      26 Jul 2023 05:45  Phos  2.4     07-25  Mg     1.9     07-25    TPro  6.9  /  Alb  3.6  /  TBili  0.6  /  DBili  x   /  AST  15  /  ALT  15  /  AlkPhos  62  07-26      CAPILLARY BLOOD GLUCOSE            Urinalysis Basic - ( 26 Jul 2023 05:45 )    Color: x / Appearance: x / SG: x / pH: x  Gluc: 73 mg/dL / Ketone: x  / Bili: x / Urobili: x   Blood: x / Protein: x / Nitrite: x   Leuk Esterase: x / RBC: x / WBC x   Sq Epi: x / Non Sq Epi: x / Bacteria: x        RADIOLOGY & ADDITIONAL TESTS:    Imaging Personally Reviewed:  [ ] YES  [ ] NO     Patient is a 18y old  Female who presents with a chief complaint of Acute encephalopathy 2/2 Suspected drug overdose (25 Jul 2023 15:45)      INTERVAL HPI/OVERNIGHT EVENTS: No acute intervention, remains on CO for safety, denies SO         REVIEW OF SYSTEMS:  CONSTITUTIONAL: No fever, chills  ENMT:  No difficulty hearing, no change in vision  NECK: No pain or stiffness  RESPIRATORY: No cough, SOB  CARDIOVASCULAR: No chest pain, palpitations  GASTROINTESTINAL: No abdominal pain. No nausea, vomiting, or diarrhea  GENITOURINARY: No dysuria  NEUROLOGICAL: No HA  SKIN: No itching, burning, rashes, or lesions   LYMPH NODES: No enlarged glands  ENDOCRINE: No heat or cold intolerance; No hair loss  MUSCULOSKELETAL: No joint pain or swelling; No muscle, back, or extremity pain  PSYCHIATRIC: No depression, anxiety  HEME/LYMPH: No easy bruising, or bleeding gums    T(C): 36.7 (07-26-23 @ 05:10), Max: 36.9 (07-25-23 @ 12:25)  HR: 77 (07-26-23 @ 05:10) (77 - 87)  BP: 107/63 (07-26-23 @ 05:10) (98/62 - 109/68)  RR: 18 (07-26-23 @ 05:10) (16 - 18)  SpO2: 98% (07-26-23 @ 05:10) (96% - 98%)  Wt(kg): --Vital Signs Last 24 Hrs  T(C): 36.7 (26 Jul 2023 05:10), Max: 36.9 (25 Jul 2023 12:25)  T(F): 98 (26 Jul 2023 05:10), Max: 98.4 (25 Jul 2023 12:25)  HR: 77 (26 Jul 2023 05:10) (77 - 87)  BP: 107/63 (26 Jul 2023 05:10) (98/62 - 109/68)  BP(mean): --  RR: 18 (26 Jul 2023 05:10) (16 - 18)  SpO2: 98% (26 Jul 2023 05:10) (96% - 98%)    Parameters below as of 26 Jul 2023 05:10  Patient On (Oxygen Delivery Method): room air        PHYSICAL EXAM:  GENERAL: NAD  EYES: clear conjunctiva; EOMI  ENMT: Moist mucous membranes  NECK: Supple, No JVD, Normal thyroid  CHEST/LUNG: Clear to auscultation bilaterally; No rales, rhonchi, wheezing, or rubs  HEART: S1, S2, Regular rate and rhythm  ABDOMEN: Soft, Nontender, Nondistended; Bowel sounds present  NEURO: Alert & Oriented X3  EXTREMITIES: No LE edema, no calf tenderness  LYMPH: No lymphadenopathy noted  SKIN: No rashes or lesions    Consultant(s) Notes Reviewed:  [x ] YES  [ ] NO  Care Discussed with Consultants/Other Providers [ x] YES  [ ] NO    LABS:                        10.6   9.50  )-----------( 184      ( 26 Jul 2023 05:45 )  < from: Xray Chest 1 View- PORTABLE-Urgent (Xray Chest 1 View- PORTABLE-Urgent .) (07.24.23 @ 13:57) >  ACC: [id number] EXAM:  XR CHEST PORTABLE URGENT 1V   ORDERED BY: BRENDON SERRANO     PROCEDURE DATE:  07/24/2023          INTERPRETATION:  TECHNIQUE: Single portable view of the chest.    COMPARISON:  7/23/2023    CLINICAL HISTORY: eval interval change    FINDINGS:    Single frontal view of the chest demonstrates the lungs to be clear. The   cardiomediastinal silhouette is normal. No acute osseous abnormalities.   Overlying EKG leads and wires are noted    IMPRESSION: No acute cardiopulmonary disease process.    --- End of Report ---            LOUANN FAJARDO MD; Attending Radiologist  This document has been electronically signed. Jul 24 2023  2:25PM    < end of copied text >             32.0     07-26    139  |  105  |  8   ----------------------------<  73  3.6   |  23  |  0.43<L>    Ca    9.2      26 Jul 2023 05:45  Phos  2.4     07-25  Mg     1.9     07-25    TPro  6.9  /  Alb  3.6  /  TBili  0.6  /  DBili  x   /  AST  15  /  ALT  15  /  AlkPhos  62  07-26      CAPILLARY BLOOD GLUCOSE            Urinalysis Basic - ( 26 Jul 2023 05:45 )    Color: x / Appearance: x / SG: x / pH: x  Gluc: 73 mg/dL / Ketone: x  / Bili: x / Urobili: x   Blood: x / Protein: x / Nitrite: x   Leuk Esterase: x / RBC: x / WBC x   Sq Epi: x / Non Sq Epi: x / Bacteria: x        RADIOLOGY & ADDITIONAL TESTS:    Imaging Personally Reviewed:  [ x] YES  [ ] NO

## 2023-07-26 NOTE — PROGRESS NOTE ADULT - PROBLEM SELECTOR PLAN 3
- Patient noted with 2-3 minute one time episode of seizure, unable to protect airway subsequently intubated   - Given total 3mg Ativan in ED  - Likely 2/2 drug overdose   - Utox noted to be negative, UA noted to be negative for infection   - EEG: No epileptiform activity
- Patient noted with 2-3 minute one time episode of seizure, unable to protect airway subsequently intubated   - Given total 3mg Ativan in ED  - Likely 2/2 drug overdose   - Utox noted to be negative, UA noted to be negative for infection   - EEG: No epileptiform activity

## 2023-07-26 NOTE — DISCHARGE NOTE PROVIDER - HOSPITAL COURSE
19 yo female with no PMH presents with altered mental status after ingesting unknown amounts of diphenhydramine (approx 300 pills; 600 count bottle which was said to be half empty was found to be empty). Upon evaluation in ED, patient afebrile, noted to be tachycardic to 157 bpm but hemodynamically stable. Patient noted to have 3 minute generalized tonic- clonic seizure in which she was unable to protect her airway and subsequently intubated and ativan was given. Gastric lavage was done showing pill fragments. Labs have thus far been largely within normal range. Pt was admitted to the ICU for Acute Encephalopathy 2/2 Suspected Diphenhydramine overdose. Serial EKG's were done and showed QRS and QTc remaining within normal limits. CT head negative for acute injury and EEG negative for epileptiform activity, however chest CT revealed signs of aspiration. Vitals have largely remained within normal limits with the exception of a fever which began 7/23. In response, the pt was started on antibiotics. On 7/24, the pt underwent successful SAT/SBT. She was able to breath without any distress ORA and was found to be A&Ox3 after the coming out of sedation. She subsequently was put on 1to1 monitoring and met with the psychiatrist, Dr. Montalvo, who confirmed that the pt did try to commit suicide and that she has been experiencing depression. He recommend that the pt be downgraded from the ICU once medically stable and be subsequently admitted into psychiatric care.   Optimized for discharge to inpt Psych   Please note that this a brief summary of hospital course please refer to daily progress notes and consult notes for full course and events 19 yo female with no PMH presents with altered mental status after ingesting unknown amounts of diphenhydramine (approx 300 pills; 600 count bottle which was said to be half empty was found to be empty). Upon evaluation in ED, patient afebrile, noted to be tachycardic to 157 bpm but hemodynamically stable. Patient noted to have 3 minute generalized tonic- clonic seizure in which she was unable to protect her airway and subsequently intubated and ativan was given. Gastric lavage was done showing pill fragments. Labs have thus far been largely within normal range. Pt was admitted to the ICU for Acute Encephalopathy 2/2 Suspected Diphenhydramine overdose. Serial EKG's were done and showed QRS and QTc remaining within normal limits. CT head negative for acute injury and EEG negative for epileptiform activity, however chest CT revealed signs of aspiration. Vitals have largely remained within normal limits, however developed fevers on day 2 of admission. In response, the pt was started on IV antibiotics due to findings of aspiration pneumonia, and deescalated to PO Augmentin on discharge. On 7/24, the pt underwent successful SAT/SBT. She was able to breathe without any distress ORA and was found to be A&Ox3 after the coming out of sedation. She subsequently was put on 1to1 monitoring and met with the psychiatrist, Dr. Montalvo, who confirmed that the pt did try to commit suicide and that she has been experiencing depression. He recommend that once medically stable, be transferred to inpatient psychiatric unit.   Patient medically stable for transfer to [facility].   Please note that this a brief summary of hospital course please refer to daily progress notes and consult notes for full course and events

## 2023-07-26 NOTE — PROGRESS NOTE ADULT - ASSESSMENT
Patient is a 19 yo female with no PMH presents with altered mental status after ingesting unknown amounts of diphenhydramine (approx 300 pills; 600 count bottle which was said to be half empty was found to be empty). Upon evaluation in ED, patient afebrile, noted to be tachycardic to 157 bpm but hemodynamically stable. Patient noted to have 3 minute generalized tonic- clonic seizure in which she was unable to protect her airway and subsequently intubated and ativan was given. Pt was admitted to the ICU for Acute Encephalopathy 2/2 Suspected Diphenhydramine overdose. Patient was intubated/extubated and was Downgraded to medicine on 7/25.  She was placed on 1:1 monitoring and met with the psychiatrist, Dr. Montalvo and was recommended inpatient psych treatment.   Pending involuntary tasfer to [facility].

## 2023-07-26 NOTE — BH INPATIENT PSYCHIATRY ASSESSMENT NOTE - NSBHCHARTREVIEWLAB_PSY_A_CORE FT
07-26    139  |  105  |  8   ----------------------------<  73  3.6   |  23  |  0.43<L>    Ca    9.2      26 Jul 2023 05:45  Phos  2.4     07-25  Mg     1.9     07-25    TPro  6.9  /  Alb  3.6  /  TBili  0.6  /  DBili  x   /  AST  15  /  ALT  15  /  AlkPhos  62  07-26

## 2023-07-26 NOTE — BH INPATIENT PSYCHIATRY ASSESSMENT NOTE - NSBHMSEAFFQUAL_PSY_A_CORE
Pedro Madden (MD; PhD)  Cardiology; Internal Medicine; Vascular Medicine  [phone] [place] [address], [address]  [place] [address]  Phone: [phone]  Fax: [phone]  Follow Up Time:   
Depressed

## 2023-07-26 NOTE — PROGRESS NOTE ADULT - PROBLEM SELECTOR PLAN 2
Toxic metabolic encephalopathy   - 2/2 to ingestion fo benadryl  - CT head: No signs of acute brain infarct or hemorrhage  - resolved
Patient presented with altered mental status after ingesting half a bottle of benadryl vs seizure activity   - CT head: No signs of acute brain infarct or hemorrhage  - mental status improved   - zoloft daily  - f/u blood cultures

## 2023-07-26 NOTE — BH INPATIENT PSYCHIATRY ASSESSMENT NOTE - NSBHCHARTREVIEWVS_PSY_A_CORE FT
Vital Signs Last 24 Hrs  T(C): 37.1 (07-26-23 @ 18:23), Max: 37.1 (07-26-23 @ 12:56)  T(F): 98.7 (07-26-23 @ 18:23), Max: 98.8 (07-26-23 @ 12:56)  HR: 100 (07-26-23 @ 18:23) (77 - 100)  BP: 113/78 (07-26-23 @ 18:23) (98/62 - 113/78)  BP(mean): --  RR: 18 (07-26-23 @ 18:23) (16 - 18)  SpO2: 96% (07-26-23 @ 18:23) (96% - 98%)

## 2023-07-26 NOTE — BH INPATIENT PSYCHIATRY ASSESSMENT NOTE - NSBHMETABOLIC_PSY_ALL_CORE_FT
BMI: BMI (kg/m2): 23.6 (07-23-23 @ 03:00)  HbA1c:   Glucose: POCT Blood Glucose.: 75 mg/dL (07-24-23 @ 06:51)    BP: --  Lipid Panel: Date/Time: 07-23-23 @ 03:30  Cholesterol, Serum: --  Direct LDL: --  HDL Cholesterol, Serum: --  Total Cholesterol/HDL Ration Measurement: --  Triglycerides, Serum: 141

## 2023-07-26 NOTE — DISCHARGE NOTE NURSING/CASE MANAGEMENT/SOCIAL WORK - PATIENT PORTAL LINK FT
You can access the FollowMyHealth Patient Portal offered by [facility] by registering at the following website: [email or web address]. By joining [employer]s FollowMyHealth portal, you will also be able to view your health information using other applications (apps) compatible with our system.

## 2023-07-26 NOTE — PROGRESS NOTE ADULT - REASON FOR ADMISSION
Acute encephalopathy 2/2 Suspected drug overdose

## 2023-07-26 NOTE — BH INPATIENT PSYCHIATRY ASSESSMENT NOTE - RISK ASSESSMENT
recent high lethality intentional overdose with intent to end her life resulting in critical care admission; untreated depression since 2020 that has been worsening, MDE symptoms, no current access to care, readily identifiable stressors

## 2023-07-26 NOTE — PROGRESS NOTE ADULT - PROBLEM SELECTOR PLAN 5
pt from home   plan to discharge to [facility] once bed is ready
Pending bed to  inpatient involuntary  psych admission to [facility]

## 2023-07-26 NOTE — BH INPATIENT PSYCHIATRY ASSESSMENT NOTE - HPI (INCLUDE ILLNESS QUALITY, SEVERITY, DURATION, TIMING, CONTEXT, MODIFYING FACTORS, ASSOCIATED SIGNS AND SYMPTOMS)
TRANSFERRED FROM [place] MEDICAL FLOORS: 17 y/o F, single, noncaregiver, domiciled with family, [employer] student, with no past psychiatric hx, BIB EMS on 7/22/23 to the  ED for AMS noted by Pt's mother around 8PM. Pt was found on sofa with pink colored emesis and was found altered with empty bottle of benadryl which contained 600 pills and was previously half full.  Patient noted to have 3 minute generalized tonic- clonic seizure in the ED during which time she was unable to protect her airway and subsequently intubated and ativan was given. Gastric lavage was done showing pill fragments. Patient admitted to ICU, extubated on 7/24/23.  Serial EKG's were done and showed QRS and QTc remaining within normal limits. CT head negative for acute injury and EEG negative for epileptiform activity, however chest CT revealed signs of aspiration pneumonia so she was started on amoxicillin-clavulanate 875 mg-125 mg oral PO q12hrs for 5 days, started on 7/26/23. Patient seen by  Psychiatry - Pt reported that she has been feeling depressed for several weeks, started to feel very depressed since 2020, but that for the past few weeks her symptoms have been worse. Says that the combination of her parent's divorce, as well as her work and college pressures, led to her to try to kill herself. Says that she took an unknown number of pills in an effort to kill herself, felt  embarrassed about what happened and is concerned that her mother will be upset with her. Reports some feelings of hopelessness and guilt. Denied any recent family or work stressors. Also acknowledges several cuts to her left wrist done with the purpose of harming herself. She was started on Zoloft 25mg PO qd. Transferred to [facility] on a 9.27 involuntary on 7/26/23.

## 2023-07-26 NOTE — DISCHARGE NOTE PROVIDER - CARE PROVIDER_API CALL
Nelly Krishna  / Medicine  [address] [place] [address], [address] [address]  Phone: [phone]  Fax: [phone]  Follow Up Time: 1 week    belen glover  Follow up with Psychiatrist at facility  Phone: (   )    -  Fax: (   )    -  Follow Up Time: 1-3 days

## 2023-07-26 NOTE — DISCHARGE NOTE PROVIDER - NSDCMRMEDTOKEN_GEN_ALL_CORE_FT
acetaminophen 325 mg oral tablet: 2 tab(s) orally every 6 hours As needed Temp greater or equal to 38C (100.4F), Mild Pain (1 - 3)  amoxicillin-clavulanate 875 mg-125 mg oral tablet: 1 tab(s) orally every 12 hours  sertraline 25 mg oral tablet: 1 tab(s) orally once a day

## 2023-07-26 NOTE — BH INPATIENT PSYCHIATRY ASSESSMENT NOTE - NSBHASSESSSUMMFT_PSY_ALL_CORE
- started on Zoloft 25mg PO on 7/25/23  - continue amoxicillin-clavulanate 875 mg-125 mg oral PO q12hrs for 5 days, started on 7/26/23 until 7/30/23 for aspiration pneumonia

## 2023-07-26 NOTE — DISCHARGE NOTE NURSING/CASE MANAGEMENT/SOCIAL WORK - NSDCPEFALRISK_GEN_ALL_CORE
For information on Fall & Injury Prevention, visit: [email or web address] OR  [email or web address] OR  https://www.cdc.gov/steadi/patient.html

## 2023-07-26 NOTE — BH INPATIENT PSYCHIATRY ASSESSMENT NOTE - NSTXSUICIDDATEEST_PSY_ALL_CORE
Patient would like communication of their results via:      Cell Phone:   Telephone Information:   Mobile [phone]     Okay to leave a message containing results? Yes     Health Maintenance Due   Topic Date Due   • Varicella Vaccine (1 of 2 - 2-dose childhood series) 08/01/1998   • HPV Vaccine (1 - 2-dose series) 08/01/2008   • DTaP/Tdap/Td Vaccine (1 - Tdap) 08/01/2016   • Cervical Cancer Screening  08/01/2018     Patient is due for the topics as listed above and wishes to proceed with them. .                     26-Jul-2023

## 2023-07-26 NOTE — BH INPATIENT PSYCHIATRY ASSESSMENT NOTE - NSBHCHARTREVIEWINVESTIGATE_PSY_A_CORE FT
CT Chest No Cont (07.23.23 ) Multifocal right lung opacities compatible with aspiration pneumonia

## 2023-07-27 PROCEDURE — 90853 GROUP PSYCHOTHERAPY: CPT

## 2023-07-27 PROCEDURE — 99223 1ST HOSP IP/OBS HIGH 75: CPT

## 2023-07-27 PROCEDURE — 99231 SBSQ HOSP IP/OBS SF/LOW 25: CPT

## 2023-07-27 RX ORDER — SERTRALINE 25 MG/1
50 TABLET, FILM COATED ORAL DAILY
Refills: 0 | Status: DISCONTINUED | OUTPATIENT
Start: 2023-07-28 | End: 2023-07-28

## 2023-07-27 RX ADMIN — Medication 1 TABLET(S): at 08:32

## 2023-07-27 RX ADMIN — SERTRALINE 25 MILLIGRAM(S): 25 TABLET, FILM COATED ORAL at 08:33

## 2023-07-27 RX ADMIN — Medication 1 TABLET(S): at 20:50

## 2023-07-27 NOTE — CONSULT NOTE ADULT - ASSESSMENT
18F adm [facility] s/p SA by benadryl ingestion, treated for aspiration pneumonia, transferred to [facility]     Plan:  Aspiration pneumonia: RNo current symptoms, leukocytosis resolved, complete course of augmentin through 7/31 as ordered.    Benadryl ingestion: No sequelae, stable    Depression: Management per primary team

## 2023-07-27 NOTE — BH CHART NOTE - NSEVENTNOTEFT_PSY_ALL_CORE
Spoke with pt's mom (Leif - [phone]). Received sister and brother's phone numbers. Sister (Yvonne - [phone]) and brother ([phone]). Spoke with pt's mom (Pedrotayler - [phone]). Received sister and brother's phone numbers. Sister (Yvonne - [phone]) and brother ([phone]). Provided address and information about visiting hours.

## 2023-07-27 NOTE — BH INPATIENT PSYCHIATRY PROGRESS NOTE - NSBHMSEKNOWHOW_PSY_ALL_CORE
Mckenzie Beauchamp is a 43 y.o. female 34w7d        OB History    Para Term  AB Living   2 1 1     1   SAB TAB Ectopic Molar Multiple Live Births             1      # Outcome Date GA Lbr Iam/2nd Weight Sex Delivery Anes PTL Lv   2 Current            1 Term 04   7 lb (3.175 kg) F Vag-Spont EPI N PAPO         Blood pressure 117/75, pulse 80, resp. rate 18, height 5' 4\" (1.626 m), weight 166 lb 9.6 oz (75.6 kg), last menstrual period 2018, not currently breastfeeding. The patient was seen and evaluated. There was positive fetal movements. No contractions or leakage of fluid. Signs and symptoms of  labor as well as labor were reviewed. The patient's anatomy ultrasound has been completed and reviewed with patient. The S/S of Pre-Eclampsia were reviewed with the patient in detail. She is to report any of these if they occur. She currently denies any of these. The patient is RH positive Rhogam Ordered: Not indicated. Patient Active Problem List    Diagnosis Date Noted    Head trauma      as a child      Sinus tarsi syndrome of right foot 2015    Right foot pain 2015     Patient doing well. Glucose testing will be done next week. Patient advised to continue taking prenatal vitamins and to rest as necessary. The patient will return to the office for her next visit in 1 week. See antepartum flow sheet. Trista James am scribing for, and in the presence of Dr. Spring Lewis. Electronically signed by: Genaro Richmond 19 11:27 AM   I agree to the above documentation placed by my scribe Genaro Richmond. I reviewed the scribe's note and agree with the documented findings and plan of care. Any areas of disagreement are noted on the chart. I have personally evaluated this patient. Additional findings are as noted.  I agree with the chief complaint, past medical history, past surgical history, allergies, medications, social and family history
Current Events/Vocabulary

## 2023-07-27 NOTE — BH PATIENT PROFILE - HOME MEDICATIONS
amoxicillin-clavulanate 875 mg-125 mg oral tablet , 1 tab(s) orally every 12 hours  sertraline 25 mg oral tablet , 1 tab(s) orally once a day  acetaminophen 325 mg oral tablet , 2 tab(s) orally every 6 hours As needed Temp greater or equal to 38C (100.4F), Mild Pain (1 - 3)

## 2023-07-27 NOTE — BH INPATIENT PSYCHIATRY PROGRESS NOTE - NSBHFUPINTERVALHXFT_PSY_A_CORE
Chart reviewed. Case d/w interdisciplinary team. Patient seen and examined. No acute events overnight. Compliant with standing medications. Slept per sleep log at 11pm without awakenings.     Today, pt discusses events leading up to and current thoughts about hospitalization. States that reason for admission was result of swallowing pills last Saturday around 6pm, but difficult to recall details. Took the pills to sleep with the intention of not waking up. Awoke 2 days later in the hospital, feeling regretful and embarrassed about the attempt. Was arguing with mom the day prior and felt sad and guilty in aftermath; triggered the suicide attempt next day. No planning, urge, or passive SI prior to this. Notes that these arguments occur often -- ever since her parents  in 2020, and pt admits that she often incites them. Pt explains that in her culture, women only  once, and has felt upset about her mom seeing someone new. Has felt depressed since the divorce, citing ill effects to concentration, i.e. inability to study, decreased appetite, interest in previously enjoyable activities, and energy, though sleep as been okay. Feels guilt and anger surrounding arguments with mom. States that 2 sisters and brother live with her as well, and appear to be agreeable with mom's new partner. Denies passive and active SI currently. Denies past and current NSSI urges/behaviors. Denies AVH. Denies paranoia.        Describes mood today as "normal-uncomfortable". Is unhappy with hospital stay, citing discomfort in unfamiliarity. Plans to cope with familial contention by moving out. Somewhat has an appetite; has had breakfast this morning. Does not wish to socialize or attend therapy groups, though agrees to try medications. Isolating and continues to feel some guilt related to mom and prior arguments.     Slept before midnight without awakenings, without PRNs. Continued dizziness since medical hospitalization without n/v. Had abdominal pain last night though none this morning and none previously. No c/f medication issues or side effects. Chart reviewed. Case d/w interdisciplinary team. Patient seen and examined. No acute events overnight. Compliant with standing medications. Slept per sleep log at 11pm without awakenings.     Today, pt discusses events leading up to and current thoughts about hospitalization. States that reason for admission was result of swallowing pills last Saturday around 6pm, but difficult to recall details. Took the pills to sleep with the intention of not waking up. Awoke 2 days later in the hospital, feeling regretful and embarrassed about the attempt. Was arguing with mom the day prior and felt sad and guilty in aftermath; triggered the suicide attempt next day. No planning, urge, or passive SI prior to this. Notes that these arguments occur often -- ever since her parents  in 2020, and pt admits that she often incites them. Pt explains that in her culture, women only  once, and has felt upset about her mom seeing someone new. Has felt depressed since the divorce, citing ill effects to concentration, i.e. inability to study, decreased appetite, interest in previously enjoyable activities, and energy, though sleep as been okay. Feels guilt and anger surrounding arguments with mom. States that 2 sisters and brother live with her as well, and appear to be agreeable with mom's new partner. Denies passive and active SI currently. Denies past and current NSSI urges/behaviors. Denies AVH. Denies paranoia.        Describes mood today as "normal-uncomfortable". Is unhappy with hospital stay, citing discomfort in unfamiliarity. Plans to cope with familial contention by moving out. Somewhat has an appetite; has had breakfast this morning. Does not wish to socialize or attend therapy groups, though agrees to try medications. Has never had a psychiatrist or therapist. Isolating and continues to feel some guilt related to mom and prior arguments.     Slept before midnight without awakenings, without PRNs. Continued dizziness since medical hospitalization without n/v. Had abdominal pain last night though none this morning and none previously. No c/f medication issues or side effects.

## 2023-07-27 NOTE — BH PATIENT PROFILE - FUNCTIONAL ASSESSMENT - DAILY ACTIVITY 6.
Can change to once daily. No reason for BID from my perspective.  
Faxed and Scanned     
Received [facility] Medicare Detail written form for test strips.   Placed in bin for MD.   
Received and placed in MA bin   
Received another request. Faxed again.   
Signed.  
Spoke with patient to clarify...  Patient states she checks her blood sugar once a day.   Updated med list and form. New updated script sent.   Will still need form signed, placed back in MD bin.     
4 = No assist / stand by assistance

## 2023-07-27 NOTE — BH INPATIENT PSYCHIATRY PROGRESS NOTE - MSE UNSTRUCTURED FT
SHILA/BEH: Adult female in no acute distress; dressed in hospital gown; cooperative; appropriate eye contact and distant relatedness; isolating to room, did not wish to leave room - conducted bedside interview  SPEECH: Normal rate, tone, and volume   MOTOR: No PMR/PMA; no abnormal movements.  MOOD: "normal-uncomfortable".  AFFECT: dysthymic; at times becomes tearful discussing mom and guilt surrounding arguments and divorce; congruent to reported mood and thought content  THOUGHT PROCESS: Linear, logical, and goal-directed.  THOUGHT CONTENT: Denies passive and active SI today. Denies NSSI urges or behavior today. Denies HI. No evidence of delusions, AVH. Focuses on discharge and expresses regret/embarrassment surrounding prior attempt  COGNITION: Grossly intact.  INSIGHT: limited  JUDGEMENT: limited  IMPULSE CONTROL: fair on exam

## 2023-07-27 NOTE — BH CHART NOTE - NSEVENTNOTEFT_PSY_ALL_CORE
Called pt's mom ([phone]). Pt did NOT give consent to speak with mom about hospital course. Purpose of call is so that mom can bring pt clothes. Visiting hours are 7-8pm and only 3 sets of clothes can be brought.    Mom did not . Will try again tomorrow.

## 2023-07-27 NOTE — CONSULT NOTE ADULT - SUBJECTIVE AND OBJECTIVE BOX
HPI: Pt is 18F admitted to [facility] 7/22/23 after ingestion of benadryl, she had a seizure.  Gastric lavage was preformed and she was intubated, extubated on 7/24.  CT chest was concerning for aspiration pneumonia and she ie being treated with Augmentin.  leukocytosi normalized and there were no EKG changes.  She was stable for transfer to [facility] 7/26/23.      PAST MEDICAL & SURGICAL HISTORY:  No pertinent past medical history      No significant past surgical history          Review of Systems:   CONSTITUTIONAL: No fever, weight loss, or fatigue  EYES: No eye pain, visual disturbances, or discharge  ENMT:  No difficulty hearing, tinnitus, vertigo; No sinus or throat pain  NECK: No pain or stiffness  RESPIRATORY: No cough, wheezing, chills or hemoptysis; No shortness of breath  CARDIOVASCULAR: No chest pain, palpitations, dizziness, or leg swelling  GASTROINTESTINAL: No abdominal or epigastric pain. No nausea, vomiting, or hematemesis; No diarrhea or constipation. No melena or hematochezia.  GENITOURINARY: No dysuria, frequency, hematuria, or incontinence  NEUROLOGICAL: No headaches, memory loss, loss of strength, numbness, or tremors  SKIN: No itching, burning, rashes, or lesions   LYMPH NODES: No enlarged glands  ENDOCRINE: No heat or cold intolerance; No hair loss  MUSCULOSKELETAL: No joint pain or swelling; No muscle, back, or extremity pain  HEME/LYMPH: No easy bruising, or bleeding gums  ALLERY AND IMMUNOLOGIC: No hives or eczema    Allergies    No Known Allergies    Intolerances        Social History: no etoh tob idu    FAMILY HISTORY:  No pertinent family history in first degree relatives        MEDICATIONS  (STANDING):  amoxicillin  875 milliGRAM(s)/clavulanate 1 Tablet(s) Oral every 12 hours  melatonin. 5 milliGRAM(s) Oral at bedtime    MEDICATIONS  (PRN):  acetaminophen     Tablet .. 650 milliGRAM(s) Oral every 6 hours PRN Temp greater or equal to 38C (100.4F), Mild Pain (1 - 3)  aluminum hydroxide/magnesium hydroxide/simethicone Suspension 30 milliLiter(s) Oral every 6 hours PRN Dyspepsia  benzocaine/menthol Lozenge 1 Lozenge Oral five times a day PRN Sore Throat  Biotene Dry Mouth Oral Rinse 5 milliLiter(s) Swish and Spit three times a day PRN dry mouth  LORazepam     Tablet 0.5 milliGRAM(s) Oral three times a day PRN Anxiety  LORazepam   Injectable 2 milliGRAM(s) IntraMuscular once PRN agitation  magnesium hydroxide Suspension 30 milliLiter(s) Oral daily PRN Constipation      Vital Signs Last 24 Hrs  T(C): 36.3 (27 Jul 2023 07:50), Max: 37.6 (26 Jul 2023 19:15)  T(F): 97.4 (27 Jul 2023 07:50), Max: 99.6 (26 Jul 2023 19:15)  HR: 100 (26 Jul 2023 18:23) (100 - 100)  BP: 113/78 (26 Jul 2023 18:23) (113/78 - 113/78)  BP(mean): --  RR: 18 (27 Jul 2023 07:50) (16 - 18)  SpO2: 100% (26 Jul 2023 19:15) (96% - 100%)    Parameters below as of 26 Jul 2023 19:15  Patient On (Oxygen Delivery Method): room air      CAPILLARY BLOOD GLUCOSE            PHYSICAL EXAM:  GENERAL: NAD  HEAD:  Atraumatic, Normocephalic  EYES: EOMI, conjunctiva and sclera clear  NECK: Supple, No JVD  CHEST/LUNG: Clear to auscultation bilaterally; No wheeze  HEART: Regular rate and rhythm; No murmurs, rubs, or gallops  ABDOMEN: Soft, Nontender, Nondistended; Bowel sounds present  EXTREMITIES:  2+ Peripheral Pulses, No clubbing, cyanosis, or edema  NEUROLOGY: non-focal  SKIN: No rashes or lesions    LABS:                        10.6   9.50  )-----------( 184      ( 26 Jul 2023 05:45 )             32.0     07-26    139  |  105  |  8   ----------------------------<  73  3.6   |  23  |  0.43<L>    Ca    9.2      26 Jul 2023 05:45    TPro  6.9  /  Alb  3.6  /  TBili  0.6  /  DBili  x   /  AST  15  /  ALT  15  /  AlkPhos  62  07-26          Urinalysis Basic - ( 26 Jul 2023 05:45 )    Color: x / Appearance: x / SG: x / pH: x  Gluc: 73 mg/dL / Ketone: x  / Bili: x / Urobili: x   Blood: x / Protein: x / Nitrite: x   Leuk Esterase: x / RBC: x / WBC x   Sq Epi: x / Non Sq Epi: x / Bacteria: x        EKG(personally reviewed): normal    RADIOLOGY & ADDITIONAL TESTS:  < from: CT Chest No Cont (07.23.23 @ 01:27) >    ACC: [id number] EXAM:  CT CHEST   ORDERED BY: JULI MENDOSA     PROCEDURE DATE:  07/23/2023          INTERPRETATION:  CLINICAL INFORMATION: Unresponsive. Seizure. Intubated.   Status post gastric lavage.    COMPARISON: None.    CONTRAST/COMPLICATIONS:  IV Contrast: NONE  Oral Contrast: NONE  Complications: None reported at time of study completion    PROCEDURE:  CT of the Chest, Abdomen and Pelvis was performed.  Sagittal and coronal reformats were performed.    FINDINGS:  CHEST:  LUNGS AND LARGE AIRWAYS: Patent central airways. Endotracheal tube in   place with the tip entering the right mainstem bronchus. There is patchy   consolidative opacity with air bronchograms in the right upper lobe   posteriorly, infrahilar right lower lobe, and dependent right lower lobe,   compatible with aspiration pneumonia. No pulmonary nodules.  PLEURA: No pleural effusion. No pneumothorax or pneumomediastinum.  VESSELS: Within normal limits.  HEART: Heart size is normal. No pericardial effusion.  MEDIASTINUM AND ELODIA: No lymphadenopathy.  CHEST WALL AND LOWER NECK: Within normal limits.    ABDOMEN AND PELVIS:  LIVER: Within normal limits.  BILE DUCTS: Normal caliber.  GALLBLADDER: Within normal limits.  SPLEEN: Within normal limits.  PANCREAS: Within normal limits.  ADRENALS: Within normal limits.  KIDNEYS/URETERS: Within normal limits.    BLADDER: Sierra in the collapsed bladder.  REPRODUCTIVE ORGANS: Uterus and adnexa within normal limits.    BOWEL: NGT in place. The stomach is fluid and gas distended. No bowel   obstruction. Appendix is normal.  PERITONEUM: No ascites.  VESSELS: Within normal limits.  RETROPERITONEUM/LYMPH NODES: No lymphadenopathy.  ABDOMINAL WALL: Within normal limits.  BONES: Within normal limits.    IMPRESSION:  1. Endotracheal tube tip entering the right mainstem bronchus. Recommend   3 cm retraction.  2. Multifocal right lung opacities compatible with aspiration pneumonia.  3. Results discussed with Dr. Wade at 8:45 a.m.    --- End of Report ---            BHUMIKA OWENS MD; Attending Radiologist  This document has been electronically signed. Jul 23 2023 11:31AM    < end of copied text >

## 2023-07-27 NOTE — BH INPATIENT PSYCHIATRY PROGRESS NOTE - NSBHASSESSSUMMFT_PSY_ALL_CORE
TRANSFERRED FROM [place] MEDICAL FLOORS: 17 y/o F, single, noncaregiver, domiciled with family, [employer] student, with no past psychiatric hx, BIB EMS on 7/22/23 to the  ED for AMS noted by Pt's mother around 8PM.  19 y/o F, single, noncaregiver, domiciled with family (2 sisters and 1 brother), [employer] student, with no past psychiatric hx, no hx NSSIB, BIB EMS on 7/22/23 to the  ED for AMS noted by Pt's mother around 8PM, transferred to [facility] for worsening depression and recent suicide attempt.     Ddx impression MDD, single episode    On the unit, pt was isolating to room though woke up to eat breakfast. Pt did not attend groups and is hesistant to engage in therapy groups. Continued low mood, fatigue, and guilt. Focused on discharge and reducing connection to mom; does not want mom to be involved in pt care at this time, and would like to move out of home. Denies passive and active SI today. Denies NSSI urges and behaviors. Expresses some physical discomfort, i.e. persisting dizziness and now resolved abdominal pain last night. BP today within normal limits. Agreeable to continue medications -- will increase Zoloft to 50mg today.     Patient continues to require hospitalization for safety and optimization of medications/stabilization given recent SA.    1. Legal: Admit to 1S, 9.27 2PC  2. Safety: denying current passive/active SI, routine obs appropriate  3. Psychopharm:  - Increase Zoloft to 50mg today for depression  4. Medical: Hospitalist to visit and provide comments on pt's continued dizziness. Will monitor abdominal pain for recurrence.  5. Therapy: I/G/M therapy focused on DBT skills  6. Collateral: Collateral from   Pt's mom: [phone] **no consent to provide hospitalization information, only for clothing drop off  7. Dispo: d/c following safety plan and med optimization

## 2023-07-27 NOTE — PSYCHIATRIC REHAB INITIAL EVALUATION - NSBHPRRECOMMEND_PSY_ALL_CORE
Writer met with patient in order to orient patient to unit, provide patient with a schedule, and introduce patient to psychiatric staff and department functions. Patient was verbal, polite, and cooperative with personal information. Writer collaborated with patient to select an appropriate psychiatric rehabilitation goal. Psychiatric rehabilitation staff will continue to engage patient daily in order to develop therapeutic rapport.

## 2023-07-27 NOTE — BH INPATIENT PSYCHIATRY PROGRESS NOTE - NSBHCHARTREVIEWVS_PSY_A_CORE FT
Vital Signs Last 24 Hrs  T(C): 36.3 (07-27-23 @ 07:50), Max: 37.6 (07-26-23 @ 19:15)  T(F): 97.4 (07-27-23 @ 07:50), Max: 99.6 (07-26-23 @ 19:15)  HR: 100 (07-26-23 @ 18:23) (100 - 100)  BP: 113/78 (07-26-23 @ 18:23) (113/78 - 113/78)  BP(mean): --  RR: 18 (07-27-23 @ 07:50) (16 - 18)  SpO2: 100% (07-26-23 @ 19:15) (96% - 100%)    Orthostatic VS  07-27-23 @ 07:50  Lying BP: --/-- HR: --  Sitting BP: 106/53 HR: 93  Standing BP: 98/52 HR: 118  Site: --  Mode: --  Orthostatic VS  07-26-23 @ 19:15  Lying BP: --/-- HR: --  Sitting BP: 108/72 HR: 82  Standing BP: 108/71 HR: 78  Site: --  Mode: --

## 2023-07-28 LAB
CULTURE RESULTS: SIGNIFICANT CHANGE UP
CULTURE RESULTS: SIGNIFICANT CHANGE UP
DPYD GENOTYPE: SIGNIFICANT CHANGE UP
DPYD PHENOTYPE: NORMAL — SIGNIFICANT CHANGE UP
SPECIMEN SOURCE: SIGNIFICANT CHANGE UP
SPECIMEN SOURCE: SIGNIFICANT CHANGE UP

## 2023-07-28 PROCEDURE — 99231 SBSQ HOSP IP/OBS SF/LOW 25: CPT

## 2023-07-28 PROCEDURE — 90853 GROUP PSYCHOTHERAPY: CPT

## 2023-07-28 RX ORDER — LANOLIN ALCOHOL/MO/W.PET/CERES
5 CREAM (GRAM) TOPICAL AT BEDTIME
Refills: 0 | Status: DISCONTINUED | OUTPATIENT
Start: 2023-07-28 | End: 2023-08-09

## 2023-07-28 RX ORDER — SERTRALINE 25 MG/1
50 TABLET, FILM COATED ORAL AT BEDTIME
Refills: 0 | Status: DISCONTINUED | OUTPATIENT
Start: 2023-07-28 | End: 2023-08-09

## 2023-07-28 RX ADMIN — SERTRALINE 50 MILLIGRAM(S): 25 TABLET, FILM COATED ORAL at 21:45

## 2023-07-28 RX ADMIN — Medication 1 TABLET(S): at 08:33

## 2023-07-28 RX ADMIN — Medication 1 TABLET(S): at 21:46

## 2023-07-28 NOTE — BH CHART NOTE - NSEVENTNOTEFT_PSY_ALL_CORE
Tried to call pt's mom ([phone]). Called using [employer] . Mom did not . Will try again later.

## 2023-07-28 NOTE — BH INPATIENT PSYCHIATRY PROGRESS NOTE - NSBHCHARTREVIEWVS_PSY_A_CORE FT
Vital Signs Last 24 Hrs  T(C): 36.6 (07-28-23 @ 07:52), Max: 36.8 (07-27-23 @ 20:37)  T(F): 97.9 (07-28-23 @ 07:52), Max: 98.2 (07-27-23 @ 20:37)  HR: 95 (07-28-23 @ 07:52) (95 - 95)  BP: 101/59 (07-28-23 @ 07:52) (101/59 - 101/59)  BP(mean): --  RR: 14 (07-28-23 @ 07:52) (14 - 14)  SpO2: --    Orthostatic VS  07-27-23 @ 07:50  Lying BP: --/-- HR: --  Sitting BP: 106/53 HR: 93  Standing BP: 98/52 HR: 118  Site: --  Mode: --  Orthostatic VS  07-26-23 @ 19:15  Lying BP: --/-- HR: --  Sitting BP: 108/72 HR: 82  Standing BP: 108/71 HR: 78  Site: --  Mode: --

## 2023-07-28 NOTE — BH INPATIENT PSYCHIATRY PROGRESS NOTE - NSBHASSESSSUMMFT_PSY_ALL_CORE
Preferred name "Cy" 17 y/o F, single, noncaregiver, domiciled with family (2 sisters and 1 brother), [employer] student, with no past psychiatric hx, no hx NSSIB, BIB EMS on 7/22/23 to the  ED for AMS noted by Pt's mother around 8PM, transferred to [facility] for worsening depression and recent suicide attempt.     Ddx impression MDD, single episode    On the unit, pt had been socializing yesterday, able to eat all meals. Pt did not attend groups, feeling bored with hospitalization, though more willing to try engaging in therapy. Continued sadness and fatigue, though normal concentration and appetite. Focused on discharge and hesitation to communicate with mom. Pt might call today though. Denies passive and active SI today. Denies NSSI urges and behaviors. Expresses resolved physical discomfort, both dizziness and abdominal pain. BP today within normal limits. Agreeable to continue Zoloft 50mg, will change to QHS due to some sedation.    Patient continues to require hospitalization for safety and optimization of medications/stabilization given recent SA.    1. Legal: Admit to 1S, 9.27 2PC  2. Safety: denying current passive/active SI, routine obs appropriate  3. Psychopharm:  - Continue Zoloft 50mg for depression, change to QHS  4. Medical: Hospitalist visited pt, continue augmentin until 7/31  5. Therapy: I/G/M therapy focused on DBT skills  6. Collateral: Collateral from   Pt's mom: [phone] **no consent to provide hospitalization information, only for clothing drop off  7. Dispo: d/c following safety plan and med optimization

## 2023-07-28 NOTE — BH INPATIENT PSYCHIATRY PROGRESS NOTE - NSBHFUPINTERVALHXFT_PSY_A_CORE
Chart reviewed. Case d/w interdisciplinary team. Patient seen and examined. No acute events overnight. Compliant with standing medications. Slept per sleep log at 11pm without awakenings.     Today, pt discusses time on unit. States that dizziness subsided yesterday afternoon and felt well enough to leave room. Has been socializing with peers, going outside, playing basketball. Mom did not visit yesterday; pt was not surprised by this, worries that mom is upset with her. Endorses continued guilt surrounding arguments and recent attempts, though does not want to remain hospitalized. Plans to cope by moving out of family home. 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 When school starts, plans to continue studies and quit one of those jobs, while continuing to work at the other two. Worried about status of the jobs, though reluctant to speak with mom. Might call today.      Describes mood today as "bored". Expresses some continued sadness surrounding recent events and relationship with mom. Has not attended groups, though willing to try. States that sleep has been restful and undisturbed; had some difficulty falling asleep due to cold temperature of room. Able to eat "little by little" of regular diet. Notes normal energy and concentration. Despite boredom, has been able to enjoy community events on unit.      Slept before midnight without awakenings, without PRNs. Resolved dizziness and abdominal pain today. No c/f medication issues or side effects aside from grogginess 2/2 zoloft.

## 2023-07-28 NOTE — BH INPATIENT PSYCHIATRY PROGRESS NOTE - MSE UNSTRUCTURED FT
SHILA/BEH: Adult female in no acute distress; dressed in hospital gown; cooperative; appropriate eye contact and distant relatedness; just waking up upon approach - conducted bedside interview  SPEECH: Normal rate, tone, and volume   MOTOR: No PMR/PMA; no abnormal movements.  MOOD: "bored".  AFFECT: dysthymic; constricted; while not tearful today, becomes somewhat distant when discussing family and some reluctance to address feelings further (may be related somewhat to language barrier); congruent to reported mood and thought content  THOUGHT PROCESS: Linear, logical, and goal-directed.  THOUGHT CONTENT: Denies passive and active SI today. Denies NSSI urges or behavior today. Denies HI. No evidence of delusions, AVH. Focuses on discharge and expresses continued regret/embarrassment surrounding prior attempt  COGNITION: Grossly intact.  INSIGHT: limited  JUDGEMENT: limited  IMPULSE CONTROL: fair on exam

## 2023-07-29 RX ADMIN — SERTRALINE 50 MILLIGRAM(S): 25 TABLET, FILM COATED ORAL at 20:28

## 2023-07-29 RX ADMIN — Medication 1 TABLET(S): at 09:12

## 2023-07-29 RX ADMIN — Medication 1 TABLET(S): at 20:27

## 2023-07-29 NOTE — BH INPATIENT PSYCHIATRY PROGRESS NOTE - MSE UNSTRUCTURED FT
On exam today the patient is generally cooperative with fair eye contact.    Speech is spoken with strong accent but is clear and of normal rate.    Thought process: with no evidence of a disorder of thought process.    Thought content: with no evidence of psychotic beliefs.  Perception: Denies hallucinations.    Mood: Describes as "less depressed".  Affect: constricted.    Patient denies active suicidal ideation, intent and plan.    Patient denies active aggressive/homicidal ideation, intent or plan.   Patient is Alert and oriented in all spheres. Patient is cognitively grossly intact.   Insight and judgment are limited. Impulse control is intact at this time.

## 2023-07-29 NOTE — BH INPATIENT PSYCHIATRY PROGRESS NOTE - NSBHFUPINTERVALHXFT_PSY_A_CORE
Patient seen for follow up of depression  Chart reviewed and case discussed with nursing staff  No acute events overnight. Compliant with standing medications.   Discussed feeling that she is feeling that the treatment here has been helpful  Reports having a nice visit with family and is more hopeful Statement Selected

## 2023-07-29 NOTE — BH INPATIENT PSYCHIATRY PROGRESS NOTE - NSBHASSESSSUMMFT_PSY_ALL_CORE
Preferred name "Cy" 17 y/o F, single, noncaregiver, domiciled with family (2 sisters and 1 brother), [employer] student, with no past psychiatric hx, no hx NSSIB, BIB EMS on 7/22/23 to the  ED for AMS noted by Pt's mother around 8PM, transferred to [facility] for worsening depression and recent suicide attempt.     Ddx impression MDD, single episode    On the unit, pt had been socializing yesterday, able to eat all meals. Pt did not attend groups, feeling bored with hospitalization, though more willing to try engaging in therapy. Continued sadness and fatigue, though normal concentration and appetite. Focused on discharge and hesitation to communicate with mom. Pt might call today though. Denies passive and active SI today. Denies NSSI urges and behaviors. Expresses resolved physical discomfort, both dizziness and abdominal pain. BP today within normal limits. Agreeable to continue Zoloft 50mg, will change to QHS due to some sedation.    7/29 update  Patient reports slow but continued clinical improvement  Denies hopelessness and SI    Patient continues to require hospitalization for safety and optimization of medications/stabilization given recent SA.    1. Legal: Admit to 1S, 9.27 2PC  2. Safety: denying current passive/active SI, routine obs appropriate  3. Psychopharm:  - Continue Zoloft 50mg for depression, change to QHS  4. Medical: Hospitalist visited pt, continue augmentin until 7/31  5. Therapy: I/G/M therapy focused on DBT skills  6. Collateral: Collateral from   Pt's mom: [phone] **no consent to provide hospitalization information, only for clothing drop off  7. Dispo: d/c following safety plan and med optimization

## 2023-07-29 NOTE — BH INPATIENT PSYCHIATRY PROGRESS NOTE - NSBHCHARTREVIEWVS_PSY_A_CORE FT
Vital Signs Last 24 Hrs  T(C): 36.7 (07-29-23 @ 07:54), Max: 36.7 (07-29-23 @ 07:54)  T(F): 98.1 (07-29-23 @ 07:54), Max: 98.1 (07-29-23 @ 07:54)  HR: 75 (07-29-23 @ 07:54) (75 - 75)  BP: 100/69 (07-29-23 @ 07:54) (100/69 - 100/69)  BP(mean): --  RR: --  SpO2: --

## 2023-07-30 PROCEDURE — 99231 SBSQ HOSP IP/OBS SF/LOW 25: CPT

## 2023-07-30 RX ADMIN — Medication 5 MILLIGRAM(S): at 00:03

## 2023-07-30 RX ADMIN — Medication 1 TABLET(S): at 20:35

## 2023-07-30 RX ADMIN — SERTRALINE 50 MILLIGRAM(S): 25 TABLET, FILM COATED ORAL at 20:35

## 2023-07-30 RX ADMIN — Medication 1 TABLET(S): at 09:45

## 2023-07-30 NOTE — BH INPATIENT PSYCHIATRY PROGRESS NOTE - NSBHCHARTREVIEWVS_PSY_A_CORE FT
Vital Signs Last 24 Hrs  T(C): 37 (07-30-23 @ 07:47), Max: 37.4 (07-29-23 @ 20:17)  T(F): 98.6 (07-30-23 @ 07:47), Max: 99.3 (07-29-23 @ 20:17)  HR: 104 (07-30-23 @ 07:47) (104 - 104)  BP: 99/69 (07-30-23 @ 07:47) (99/69 - 99/69)  BP(mean): --  RR: 18 (07-30-23 @ 07:47) (18 - 18)  SpO2: --

## 2023-07-30 NOTE — BH INPATIENT PSYCHIATRY PROGRESS NOTE - NSBHASSESSSUMMFT_PSY_ALL_CORE
Preferred name "Cy" 17 y/o F, single, noncaregiver, domiciled with family (2 sisters and 1 brother), [employer] student, with no past psychiatric hx, no hx NSSIB, BIB EMS on 7/22/23 to the  ED for AMS noted by Pt's mother around 8PM, transferred to [facility] for worsening depression and recent suicide attempt.     Ddx impression MDD, single episode    On the unit, pt had been socializing yesterday, able to eat all meals. Pt did not attend groups, feeling bored with hospitalization, though more willing to try engaging in therapy. Continued sadness and fatigue, though normal concentration and appetite. Focused on discharge and hesitation to communicate with mom. Pt might call today though. Denies passive and active SI today. Denies NSSI urges and behaviors. Expresses resolved physical discomfort, both dizziness and abdominal pain. BP today within normal limits. Agreeable to continue Zoloft 50mg, will change to QHS due to some sedation.    7/30 update  Patient reports slow but continued clinical improvement  Minimizes the severity of her attempt but denies hopelessness and SI    Patient continues to require hospitalization for safety and optimization of medications/stabilization given recent SA.    1. Legal: Admit to 1S, 9.27 2PC  2. Safety: denying current passive/active SI, routine obs appropriate  3. Psychopharm:  - Continue Zoloft 50mg for depression, change to QHS  4. Medical: Hospitalist visited pt, continue augmentin until 7/31  5. Therapy: I/G/M therapy focused on DBT skills  6. Collateral: Collateral from   Pt's mom: [phone] **no consent to provide hospitalization information, only for clothing drop off  7. Dispo: d/c following safety plan and med optimization

## 2023-07-30 NOTE — BH INPATIENT PSYCHIATRY PROGRESS NOTE - NSBHFUPINTERVALHXFT_PSY_A_CORE
Pt lvm requesting lab results  Patient seen for follow up of depression  Chart reviewed and case discussed with nursing staff  No acute events overnight. Compliant with standing medications.   Discussed feeling that she is feeling that the treatment here has been helpful  Minimizes the severity of her suicide attempt

## 2023-07-31 PROCEDURE — 99231 SBSQ HOSP IP/OBS SF/LOW 25: CPT | Mod: 25

## 2023-07-31 PROCEDURE — 90853 GROUP PSYCHOTHERAPY: CPT

## 2023-07-31 RX ADMIN — Medication 1 TABLET(S): at 09:12

## 2023-07-31 RX ADMIN — SERTRALINE 50 MILLIGRAM(S): 25 TABLET, FILM COATED ORAL at 20:37

## 2023-07-31 NOTE — BH INPATIENT PSYCHIATRY PROGRESS NOTE - MSE UNSTRUCTURED FT
On exam today the patient is generally cooperative with fair eye contact.    Speech is spoken with strong accent but is clear and of normal rate.    Thought process: with no evidence of a disorder of thought process.    Thought content: with no evidence of psychotic beliefs.  Perception: Denies hallucinations.    Mood: Describes as "good".  Affect: constricted.    Patient denies active suicidal ideation, intent and plan.    Patient denies active aggressive/homicidal ideation, intent or plan.   Patient is Alert and oriented in all spheres. Patient is cognitively grossly intact.   Insight and judgment are limited. Impulse control is intact at this time.     On exam today the patient is generally cooperative with fair eye contact.    Speech is spoken with strong accent but is clear and of normal rate.    Thought process: with no evidence of a disorder of thought process.    Thought content: discussing SA, remorse, reflecting on precipitating factors. Denies urges self injure.   Perception: Denies hallucinations.    Mood: Describes as "good".  Affect: constricted.    Patient denies active suicidal ideation, intent and plan.    Patient denies active aggressive/homicidal ideation, intent or plan.   Patient is Alert and oriented in all spheres. Patient is cognitively grossly intact.   Insight and judgment are limited. Impulse control is intact at this time.

## 2023-07-31 NOTE — BH INPATIENT PSYCHIATRY PROGRESS NOTE - NSBHFUPINTERVALHXFT_PSY_A_CORE
Chart reviewed. Case discussed with interdisciplinary team. Patient seen and examined. No acute events overnight. Compliant with standing medications. Slept per sleep log at 1am.    Today, patient provided further details surrounding her suicide attempt. Stated that she had tried cutting herself with a hair clip prior to ingesting half a bottle of Benadryl. Denies previous episodes of NSSIB and previous suicide attempts. Feels grateful that she survived and is hospitalized. Currently denies passive and active suicide ideation. Amenable to the team contacting her mom for collateral. Denies side effects from medications.    Set to finish course of Augmentin today.  Patient seen for follow up for SA, depression. Chart reviewed. Case discussed with interdisciplinary team. No acute events overnight/over weekend. Compliant with standing medications. Slept per sleep log at 1am.    Today, patient provided further details surrounding her suicide attempt. Stated that she had tried cutting herself with a hair clip prior to ingesting half a bottle of Benadryl. Denies previous episodes of NSSIB and previous suicide attempts. Feels grateful that she survived and is hospitalized. Currently denies passive and active suicide ideation. Amenable to the team contacting her mom for collateral. Denies side effects from medications.    Set to finish course of Augmentin today.

## 2023-07-31 NOTE — BH INPATIENT PSYCHIATRY PROGRESS NOTE - NSBHASSESSSUMMFT_PSY_ALL_CORE
17 y/o F, single, noncaregiver, domiciled with family (2 sisters and 1 brother), [employer] student, with no past psychiatric hx, no hx NSSIB, BIB EMS on 7/22/23 to the  ED for AMS noted by Pt's mother around 8PM, transferred to [facility] for worsening depression and recent suicide attempt.     Working diagnosis: MDD, single episode    Currently, the patient endorses symptom improvement   Continued inpatient admission required for stabilization and medication optimization    Plan:  1. Legal: Admitted to , 9.27 @.   2. Safety: No reported SI/SIB/HI/VI currently on unit; continue routine observation.  3. Psychiatric: continue with Zoloft 50 mg daily. R/B/A and side effects discussed  4. Therapy: I/G/M therapy focused on DBT skills  5. Medical: No acute medical needs identified  6. Collateral from Pt's mom: [phone]. patient gave consent today  7. Disposition: When stable/pending clinical improvement      17 y/o F, single, noncaregiver, domiciled with family (2 sisters and 1 brother), [employer] student, with no past psychiatric hx, no hx NSSIB, BIB EMS on 7/22/23 to the  ED for AMS noted by Pt's mother around 8PM, transferred to [facility] for worsening depression and recent suicide attempt.     Working diagnosis: MDD, single episode severe vs BPD    Currently, the patient endorses symptom improvement, still with some lack of insight into seriousness of attempt, minimizing to some extent, but demonstrating more genuine engagement in therapy and tx.    Continued inpatient admission required for stabilization and medication optimization    Plan:  1. Legal: Admitted to , 9.27 @PC.   2. Safety: No reported SI/SIB/HI/VI currently on unit; continue routine observation.  3. Psychiatric: continue with Zoloft 50 mg daily. R/B/A and side effects discussed  4. Therapy: I/G/M therapy focused on DBT skills  5. Medical: No acute medical needs identified  6. Collateral from Pt's mom: [phone]. patient gave verbal consent today  7. Disposition: When stable/pending clinical improvement

## 2023-07-31 NOTE — BH INPATIENT PSYCHIATRY PROGRESS NOTE - NSBHCHARTREVIEWVS_PSY_A_CORE FT
Vital Signs Last 24 Hrs  T(C): 36.9 (07-31-23 @ 07:48), Max: 37.3 (07-30-23 @ 20:26)  T(F): 98.5 (07-31-23 @ 07:48), Max: 99.1 (07-30-23 @ 20:26)  HR: 98 (07-31-23 @ 07:48) (98 - 98)  BP: 105/58 (07-31-23 @ 07:48) (105/58 - 105/58)  BP(mean): --  RR: 16 (07-31-23 @ 07:48) (16 - 16)  SpO2: --

## 2023-08-01 PROCEDURE — 90853 GROUP PSYCHOTHERAPY: CPT

## 2023-08-01 RX ADMIN — SERTRALINE 50 MILLIGRAM(S): 25 TABLET, FILM COATED ORAL at 20:11

## 2023-08-01 NOTE — BH INPATIENT PSYCHIATRY PROGRESS NOTE - NSBHCHARTREVIEWVS_PSY_A_CORE FT
Vital Signs Last 24 Hrs  T(C): 36.2 (08-01-23 @ 09:00), Max: 36.2 (08-01-23 @ 09:00)  T(F): 97.2 (08-01-23 @ 09:00), Max: 97.2 (08-01-23 @ 09:00)  HR: 64 (08-01-23 @ 09:00) (64 - 64)  BP: 98/59 (08-01-23 @ 09:00) (98/59 - 98/59)  BP(mean): --  RR: 16 (08-01-23 @ 09:00) (16 - 16)  SpO2: --

## 2023-08-01 NOTE — BH INPATIENT PSYCHIATRY PROGRESS NOTE - MSE UNSTRUCTURED FT
On exam today the patient is generally cooperative with fair eye contact.    Speech is spoken with strong accent but is clear and of normal rate.    Thought process: with no evidence of a disorder of thought process.    Thought content: discussing SA, remorse, reflecting on precipitating factors. Denies urges self injure.   Perception: Denies hallucinations.    Mood: Describes as "good".  Affect: constricted.    Patient denies active suicidal ideation, intent and plan.    Patient denies active aggressive/homicidal ideation, intent or plan.   Patient is Alert and oriented in all spheres. Patient is cognitively grossly intact.   Insight and judgment are limited. Impulse control is intact at this time.     On exam today the patient is generally cooperative with fair eye contact.    Speech is spoken with strong accent but is clear and of normal rate.    Thought process: with no evidence of a disorder of thought process.    Thought content: discussing SA, remorse, reflecting on precipitating factors. Denies SI or thoughts of self harm.   Mood: Describes as "good".    Affect: Bright, constricted. Congruent  Patient is Alert and oriented in all spheres. Patient is cognitively grossly intact.   Insight and judgment are limited. Impulse control is intact on the unit   On exam today the patient is generally cooperative with fair eye contact.    Speech is spoken with strong accent but is clear and of normal rate.    Thought process: with no evidence of a disorder of thought process.    Thought content: discussing SA, remorse, reflecting on precipitating factors. Denies SI or thoughts of self harm.   Mood: Describes as "good".    Affect: Bright, constricted. Congruent  Patient is Alert and oriented in all spheres. Patient is cognitively grossly intact.   Insight: fair  Judgment: poor   are limited. Impulse control is intact on the unit

## 2023-08-01 NOTE — BH INPATIENT PSYCHIATRY PROGRESS NOTE - NSBHASSESSSUMMFT_PSY_ALL_CORE
17 y/o F, single, noncaregiver, domiciled with family (2 sisters and 1 brother), [employer] student, with no past psychiatric hx, no hx NSSIB, BIB EMS on 7/22/23 to the  ED for AMS noted by Pt's mother around 8PM, transferred to [facility] for worsening depression and recent suicide attempt.     Working diagnosis: Borderline personality disorder    Currently, patient exhibits >5/9 criteria consistent with a diagnosis of borderline personality disorder. Endorses improvement in depressive symptoms; however, still with lack of insight into seriousness of suicide attempt. Continues to minimize symptoms and precipitating factors. Improvement in engagement in therapy and treatment.     Continued inpatient admission is required for further stabilization and psychotherapy.     Plan:  1. Legal: Admitted to , 9.27 @.   2. Safety: No reported SI/SIB/HI/VI currently on unit; continue routine observation.  3. Psychiatric: continue with Zoloft 50 mg daily. R/B/A and side effects discussed  4. Therapy: I/G/M therapy focused on DBT skills  5. Medical: No acute medical needs identified  6. Collateral from Pt's mom: [phone]. patient gave verbal consent 07/31/23  7. Disposition: When stable/pending clinical improvement

## 2023-08-01 NOTE — BH INPATIENT PSYCHIATRY PROGRESS NOTE - NSBHFUPINTERVALHXFT_PSY_A_CORE
Chart reviewed. Case discussed with interdisciplinary team. Patient seen for follow-up of depression and SA. No acute events overnight. Compliant with standing medications. Slept per sleep log at 11 pm.     Today, patient endorses that therapy has been helping with her thoughts of self-harm and mutilation. Also endorses that lack of technology has helped her "focus on herself." Endorses less feelings of sadness compared to admission. However, sad that she is not with her family and misses her mom and siblings.    Patient exhibits many borderline traits. Has identity disturbance and states "what's the purpose" in response to being questioned about her life goals. Suicide attempt last week was also impulsive in nature; patient did not wake up knowing she was going to take an excess of pills. Patient also exhibited self-mutilating behavior through cutting herself with a hair clip preceding her SA. Patient also describes frequently needing to break items and go to the gym to temper her anger. Furthermore, patient endorses feeling "empty and bored," feelings that amplified after she moved from her [place] to [place]. Patient also views both parents in an exceedingly positive light and agrees that she tries controlling aspects of their life; in particular, she discourages her  parents from dating other partners.     Otherwise, patient with good appetite and PO intake. Sleeping well and interacting with others on the unit. No physical complaints. No side effects to medications.

## 2023-08-02 PROCEDURE — 90853 GROUP PSYCHOTHERAPY: CPT

## 2023-08-02 RX ADMIN — SERTRALINE 50 MILLIGRAM(S): 25 TABLET, FILM COATED ORAL at 21:34
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
## 2023-08-02 NOTE — BH INPATIENT PSYCHIATRY PROGRESS NOTE - NSBHASSESSSUMMFT_PSY_ALL_CORE
17 y/o F, single, noncaregiver, domiciled with family (2 sisters and 1 brother), [employer] student, with no past psychiatric hx, no hx NSSIB, BIB EMS on 7/22/23 to the  ED for AMS noted by Pt's mother around 8PM, transferred to [facility] for worsening depression and recent suicide attempt.    17 y/o F, single, noncaregiver, domiciled with family (2 sisters and 1 brother), [employer] student, with no past psychiatric hx, no hx NSSIB, BIB EMS on 7/22/23 to the  ED for AMS noted by Pt's mother around 8PM, transferred to [facility] for worsening depression and recent suicide attempt.     Working diagnosis: Borderline personality disorder    Currently, patient exhibits criteria consistent with a diagnosis of borderline personality disorder. Endorses improvement in depressive symptoms; however, still with lack of insight into seriousness of suicide attempt. Continues to minimize symptoms and precipitating factors. Improvement in engagement in therapy and treatment.     Continued inpatient admission is required for further stabilization and psychotherapy.     Plan:  1. Legal: Admitted to , 9.27 2P.   2. Safety: No reported SI/SIB/HI/VI currently on unit; continue routine observation.  3. Psychiatric: continue with Zoloft 50 mg daily. R/B/A and side effects discussed  4. Therapy: I/G/M therapy focused on DBT skills  5. Medical: No acute medical needs identified  6. Collateral from Pt's mom: [phone]. patient gave verbal consent 07/31/23  7. Disposition: When stable/pending clinical improvement   19 y/o F, single, noncaregiver, domiciled with family (2 sisters and 1 brother), [employer] student, with no past psychiatric hx, no hx NSSIB, BIB EMS on 7/22/23 to the  ED for AMS noted by Pt's mother around 8PM, transferred to [facility] for worsening depression and recent suicide attempt.    Working diagnosis: Borderline personality disorder    Currently, patient exhibits criteria consistent with a diagnosis of borderline personality disorder. Endorses improvement in depressive symptoms; however, still with lack of insight into seriousness of suicide attempt. Continues to minimize symptoms and precipitating factors. Improvement in engagement in therapy and treatment.     Continued inpatient admission is required for further stabilization and psychotherapy.     Plan:  1. Legal: Admitted to 1S, 9.27 2PC.   2. Safety: No reported SI/SIB/HI/VI currently on unit; continue routine observation.  3. Psychiatric: continue with Zoloft 50 mg daily. R/B/A and side effects discussed  4. Therapy: I/G/M therapy focused on DBT skills  5. Medical: No acute medical needs identified  6. Collateral from Pt's mom: [phone]. patient gave verbal consent 07/31/23  7. Disposition: When stable/pending clinical improvement

## 2023-08-02 NOTE — BH INPATIENT PSYCHIATRY PROGRESS NOTE - MSE UNSTRUCTURED FT
On exam today the patient is generally cooperative with fair eye contact.    Speech is spoken with strong accent but is clear and of normal rate.    Thought process: with no evidence of a disorder of thought process.    Thought content: discussing SA, remorse, reflecting on precipitating factors. Denies SI or thoughts of self harm.   Mood: Describes as "good".    Affect: Bright, constricted. Congruent  Patient is Alert and oriented in all spheres. Patient is cognitively grossly intact.   Insight: fair  Judgment: poor  Impulse control is intact on the unit   On exam today the patient is generally cooperative with fair eye contact.    Speech is spoken with strong accent but is clear and of normal rate.    Thought process: with no evidence of a disorder of thought process.    Thought content: discussing SA, remorse, reflecting on precipitating factors. Denies SI or thoughts of self harm.   Mood: Describes as "good".    Affect: overly bright given content of conversation. Congruent with stated mood  Patient is Alert and oriented in all spheres. Patient is cognitively grossly intact.   Insight: fair  Judgment: poor  Impulse control is intact on the unit

## 2023-08-02 NOTE — BH INPATIENT PSYCHIATRY PROGRESS NOTE - NSBHCHARTREVIEWVS_PSY_A_CORE FT
Vital Signs Last 24 Hrs  T(C): 36.6 (08-02-23 @ 06:45), Max: 36.7 (08-01-23 @ 20:33)  T(F): 97.8 (08-02-23 @ 06:45), Max: 98 (08-01-23 @ 20:33)  HR: --  BP: --  BP(mean): --  RR: 18 (08-02-23 @ 06:45) (18 - 18)  SpO2: --    Orthostatic VS  08-02-23 @ 06:45  Lying BP: --/-- HR: --  Sitting BP: 102/64 HR: 73  Standing BP: --/-- HR: --  Site: --  Mode: --

## 2023-08-03 PROCEDURE — 90853 GROUP PSYCHOTHERAPY: CPT

## 2023-08-03 RX ADMIN — Medication 650 MILLIGRAM(S): at 21:45

## 2023-08-03 RX ADMIN — Medication 650 MILLIGRAM(S): at 21:17

## 2023-08-03 RX ADMIN — SERTRALINE 50 MILLIGRAM(S): 25 TABLET, FILM COATED ORAL at 21:18

## 2023-08-03 NOTE — BH INPATIENT PSYCHIATRY PROGRESS NOTE - NSBHCHARTREVIEWVS_PSY_A_CORE FT
Vital Signs Last 24 Hrs  T(C): 36.4 (08-03-23 @ 06:30), Max: 37.4 (08-02-23 @ 20:53)  T(F): 97.6 (08-03-23 @ 06:30), Max: 99.3 (08-02-23 @ 20:53)  HR: 63 (08-03-23 @ 06:30) (63 - 63)  BP: 94/54 (08-03-23 @ 06:30) (94/54 - 94/54)  BP(mean): --  RR: 15 (08-03-23 @ 06:30) (15 - 15)  SpO2: --    Orthostatic VS  08-02-23 @ 06:45  Lying BP: --/-- HR: --  Sitting BP: 102/64 HR: 73  Standing BP: --/-- HR: --  Site: --  Mode: --

## 2023-08-03 NOTE — BH INPATIENT PSYCHIATRY PROGRESS NOTE - MSE UNSTRUCTURED FT
On exam today the patient is generally cooperative with fair eye contact.    Speech is spoken with strong accent but is clear and of normal rate.    Thought process: with no evidence of a disorder of thought process.    Thought content: discussing the future. Reflecting on interpersonal relationships. Denies SI or thoughts of self harm.   Mood: Describes as "good".    Affect: overly bright given content of conversation. Congruent with stated mood  Patient is Alert and oriented in all spheres. Patient is cognitively grossly intact.   Insight: fair  Judgment: poor  Impulse control is intact on the unit

## 2023-08-03 NOTE — BH INPATIENT PSYCHIATRY PROGRESS NOTE - NSBHFUPINTERVALHXFT_PSY_A_CORE
Chart reviewed. Case discussed with interdisciplinary team. Patient seen for follow-up of SA, depression in the context of borderline personality disorder. No acute events overnight. Compliant with standing medications. Slept per sleep log at 2300.    Today, patient is doing better on the unit. Therapy has helped her focus on herself. She has been thinking about what she wants to do when she goes home. States that the first thing will be to "hug my 4 year old sister." Pt and her sister are like "mom and daughter." She is very close to all her siblings including an older sister (20 y/o) and younger brother (12 y/o). Patient sometimes experiences feelings of immense anger and sadness however adamantly states that she will never direct those feelings toward her siblings. In addition to leaving the house, she uses humor to help her deal with her emotions at home. Happy with how therapy has been going and expresses interest in continuing it outpatient.     Otherwise, good appetite and PO intake. Sleeping well. No physical complaints. No side effects to medications reported.

## 2023-08-03 NOTE — BH CHART NOTE - NSEVENTNOTEFT_PSY_ALL_CORE
Attempted to reach patient's mother ([phone]) for collateral through [employer] . Patient's mother did not pick-up. Will try again later.

## 2023-08-03 NOTE — BH INPATIENT PSYCHIATRY PROGRESS NOTE - NSBHASSESSSUMMFT_PSY_ALL_CORE
19 y/o F, single, noncaregiver, domiciled with family (2 sisters and 1 brother), [employer] student, with no past psychiatric hx, no hx NSSIB, BIB EMS on 7/22/23 to the  ED for AMS noted by Pt's mother around 8PM, transferred to [facility] for worsening depression and recent suicide attempt.    Working diagnosis: Borderline personality disorder    Currently, patient exhibits criteria consistent with a diagnosis of borderline personality disorder. Endorses improvement in depressive symptoms; however, still with continued lack of insight into seriousness of suicide attempt. Continues to minimize symptoms and precipitating factors. Improvement in engagement in therapy and treatment.     Continued inpatient admission is required for further stabilization and psychotherapy.     Plan:  1. Legal: Admitted to 1S, 9.27 2PC.   2. Safety: No reported SI/SIB/HI/VI currently on unit; continue routine observation.  3. Psychiatric: continue with Zoloft 50 mg daily. R/B/A and side effects discussed  4. Therapy: I/G/M therapy focused on DBT skills  5. Medical: No acute medical needs identified  6. Collateral from Pt's mom: [phone]. patient gave verbal consent 07/31/23  7. Disposition: When stable/pending clinical improvement

## 2023-08-04 PROCEDURE — 90853 GROUP PSYCHOTHERAPY: CPT

## 2023-08-04 RX ADMIN — SERTRALINE 50 MILLIGRAM(S): 25 TABLET, FILM COATED ORAL at 20:37

## 2023-08-04 NOTE — BH INPATIENT PSYCHIATRY PROGRESS NOTE - MSE UNSTRUCTURED FT
On exam today the patient is generally cooperative with fair eye contact.    Speech is spoken with strong accent but is clear and of normal rate.    Thought process: with no evidence of a disorder of thought process.    Thought content: discussing the future. discharge-focused. Denies SI or thoughts of self harm.   Mood: Describes as "good".    Affect: overly bright given content of conversation. Congruent with stated mood  Patient is Alert and oriented in all spheres. Patient is cognitively grossly intact.   Insight: fair  Judgment: poor  Impulse control is intact on the unit   On exam today the patient is generally cooperative with fair eye contact.    Speech is spoken with strong accent but is clear and of normal rate.    Thought process: with no evidence of a disorder of thought process.    Thought content: discussing the future. discharge-focused. Denies SI or thoughts of self harm.   Mood: Describes as "good".    Affect: overly bright given content of conversation. Congruent with stated mood  Patient is Alert and oriented in all spheres. Patient is cognitively grossly intact.   Insight: fair  Judgment: improving  Impulse control is intact on the unit

## 2023-08-04 NOTE — BH INPATIENT PSYCHIATRY PROGRESS NOTE - NSBHFUPINTERVALHXFT_PSY_A_CORE
Chart reviewed. Case discussed with interdisciplinary team. Patient seen for f/u of SA and depression in the context of borderline personality disorder. No acute events overnight. Compliant with standing medications. Slept per sleep log at 2300.    Today, patient reports she has been doing well. Therapy has been helping her open up and she no longer feels like she has to keep her emotions to herself. She is interested in DBT after discharge for this reason. Discharge-focused with plans to "focus on myself" and "see my siblings again." Wants to focus on herself more and less on her mom.     Good appetite and PO ntake. Sleeping well. No physical complaints. No side effects to medications reported.

## 2023-08-04 NOTE — BH INPATIENT PSYCHIATRY PROGRESS NOTE - NSBHASSESSSUMMFT_PSY_ALL_CORE
17 y/o F, single, noncaregiver, domiciled with family (2 sisters and 1 brother), [employer] student, with no past psychiatric hx, no hx NSSIB, BIB EMS on 7/22/23 to the  ED for AMS noted by Pt's mother around 8PM, transferred to [facility] for worsening depression and recent suicide attempt.    Working diagnosis: Borderline personality disorder    Currently, patient exhibits criteria consistent with a diagnosis of borderline personality disorder. Endorses improvement in depressive symptoms; however, still with continued lack of insight into seriousness of suicide attempt. Continues to minimize symptoms and precipitating factors. Improvement in engagement in therapy and treatment.     Continued inpatient admission is required for further stabilization and psychotherapy.     Plan:  1. Legal: Admitted to 1S, 9.27 2PC.   2. Safety: No reported SI/SIB/HI/VI currently on unit; continue routine observation.  3. Psychiatric: continue with Zoloft 50 mg daily. R/B/A and side effects discussed  4. Therapy: I/G/M therapy focused on DBT skills  5. Medical: No acute medical needs identified  6. Collateral from Pt's mom: [phone]. patient gave verbal consent 07/31/23  7. Disposition: When stable/pending clinical improvement   19 y/o F, single, noncaregiver, domiciled with family (2 sisters and 1 brother), [employer] student, with no past psychiatric hx, no hx NSSIB, BIB EMS on 7/22/23 to the  ED for AMS noted by Pt's mother around 8PM, transferred to [facility] for worsening depression and recent suicide attempt.    Working diagnosis: Borderline personality disorder    Currently, patient exhibits criteria consistent with a diagnosis of borderline personality disorder. Endorses improvement in depressive symptoms with improving insight into seriousness of suicide attempt. Improvement in engagement in therapy and treatment.     Continued inpatient admission is required for further stabilization and psychotherapy.     Plan:  1. Legal: Admitted to 1S, 9.27 2P.   2. Safety: No reported SI/SIB/HI/VI currently on unit; continue routine observation.  3. Psychiatric: continue with Zoloft 50 mg daily. R/B/A and side effects discussed  4. Therapy: I/G/M therapy focused on DBT skills  5. Medical: No acute medical needs identified  6. Collateral from Pt's mom: [phone]. patient gave verbal consent 07/31/23  7. Disposition: When stable/pending clinical improvement

## 2023-08-04 NOTE — BH INPATIENT PSYCHIATRY PROGRESS NOTE - NSBHCHARTREVIEWVS_PSY_A_CORE FT
Vital Signs Last 24 Hrs  T(C): 36.1 (08-04-23 @ 06:23), Max: 36.8 (08-03-23 @ 20:21)  T(F): 97 (08-04-23 @ 06:23), Max: 98.2 (08-03-23 @ 20:21)  HR: 77 (08-04-23 @ 06:23) (77 - 77)  BP: 95/63 (08-04-23 @ 06:23) (95/63 - 95/63)  BP(mean): --  RR: --  SpO2: --

## 2023-08-05 RX ADMIN — SERTRALINE 50 MILLIGRAM(S): 25 TABLET, FILM COATED ORAL at 20:45

## 2023-08-06 RX ADMIN — SERTRALINE 50 MILLIGRAM(S): 25 TABLET, FILM COATED ORAL at 20:29

## 2023-08-07 PROCEDURE — 90853 GROUP PSYCHOTHERAPY: CPT

## 2023-08-07 RX ADMIN — Medication 650 MILLIGRAM(S): at 19:12

## 2023-08-07 RX ADMIN — SERTRALINE 50 MILLIGRAM(S): 25 TABLET, FILM COATED ORAL at 20:25

## 2023-08-07 RX ADMIN — Medication 650 MILLIGRAM(S): at 06:54

## 2023-08-07 NOTE — BH INPATIENT PSYCHIATRY PROGRESS NOTE - NSBHASSESSSUMMFT_PSY_ALL_CORE
19 y/o F, single, noncaregiver, domiciled with family, [employer] student, with no past psychiatric hx, no hx NSSIB, BIB EMS on 7/22/23 to the  ED for AMS, transferred to [facility] for worsening depression and recent suicide attempt.    Working diagnosis: Borderline personality disorder    Currently, patient exhibits criteria consistent with a diagnosis of borderline personality disorder. Endorses improvement in depressive symptoms with improving insight into seriousness of suicide attempt. Improvement in engagement in therapy and treatment.     Continued inpatient admission is required for further stabilization and psychotherapy.     Plan:  1. Legal: Admitted to , 9.27 2P.   2. Safety: No reported SI/SIB/HI/VI currently on unit; continue routine observation.  3. Psychiatric: continue with Zoloft 50 mg daily. R/B/A and side effects discussed  4. Therapy: I/G/M therapy focused on DBT skills  5. Medical: No acute medical needs identified  6. Collateral from Pt's mom: [phone]. patient gave verbal consent 07/31/23  7. Disposition: When stable/pending clinical improvement   17 y/o F, single, noncaregiver, domiciled with family, [employer] student, with no past psychiatric hx, no hx NSSIB, BIB EMS on 7/22/23 to the  ED for AMS, transferred to [facility] for worsening depression and recent suicide attempt.    Working diagnosis: Borderline personality disorder    Currently, patient exhibits criteria consistent with a diagnosis of borderline personality disorder. Endorses improvement in depressive symptoms with improving insight into seriousness of suicide attempt. Improvement in engagement in therapy and treatment as well as decrease in guilt and shame. She denies passive/active SI, is more hopeful and denies anhedonia.    Continued inpatient admission is required for safety and safe discharge planning.     Plan:  1. Legal: Admitted to , 9.27 2PC.   2. Safety: No reported SI/SIB/HI/VI currently on unit; continue routine observation.  3. Psychiatric: continue with Zoloft 50 mg daily. R/B/A and side effects discussed  4. Therapy: I/G/M therapy focused on DBT skills  5. Medical: No acute medical needs identified  6. Collateral from Pt's mom: [phone]. patient gave verbal consent 07/31/23--multiple outreach attempts made with no success  7. Disposition: referral sent to [facility]; likely mid-week if gains sustained

## 2023-08-07 NOTE — BH INPATIENT PSYCHIATRY PROGRESS NOTE - NSBHCHARTREVIEWVS_PSY_A_CORE FT
Vital Signs Last 24 Hrs  T(C): 35.8 (08-07-23 @ 06:28), Max: 36.6 (08-06-23 @ 20:23)  T(F): 96.5 (08-07-23 @ 06:28), Max: 97.9 (08-06-23 @ 20:23)  HR: --  BP: 99/69 (08-07-23 @ 06:28) (99/69 - 99/69)  BP(mean): 76 (08-07-23 @ 06:28) (76 - 76)  RR: 18 (08-07-23 @ 06:28) (18 - 18)  SpO2: --    Orthostatic VS  08-06-23 @ 08:48  Lying BP: --/-- HR: --  Sitting BP: 99/57 HR: 76  Standing BP: --/-- HR: --  Site: --  Mode: --   Vital Signs Last 24 Hrs  T(C): 36.7 (08-07-23 @ 20:42), Max: 36.7 (08-07-23 @ 20:42)  T(F): 98 (08-07-23 @ 20:42), Max: 98 (08-07-23 @ 20:42)  HR: --  BP: 99/69 (08-07-23 @ 06:28) (99/69 - 99/69)  BP(mean): 76 (08-07-23 @ 06:28) (76 - 76)  RR: 18 (08-07-23 @ 06:28) (18 - 18)  SpO2: --    Orthostatic VS  08-06-23 @ 08:48  Lying BP: --/-- HR: --  Sitting BP: 99/57 HR: 76  Standing BP: --/-- HR: --  Site: --  Mode: --

## 2023-08-07 NOTE — BH INPATIENT PSYCHIATRY PROGRESS NOTE - MSE UNSTRUCTURED FT
On exam today the patient is cooperative with fair eye contact.    Speech is spoken with strong accent but is clear and of normal rate.    Thought process: with no evidence of a disorder of thought process.    Thought content: discussing the future. Reflecting on past events. Discharge-focused. Denies SI or thoughts of self harm.   Mood: Describes as "good".    Affect: Bright. Full. Congruent with stated mood  Patient is alert and oriented in all spheres. Patient is cognitively grossly intact.   Insight: fair  Judgment: improving  Impulse control is intact on the unit   On exam today the patient is cooperative with appropriate eye contact and relatedness.  Speech is spoken with strong accent but is clear and of normal rate.    Thought process: linear, logical, goal directed  Thought content: discussing the future. Reflecting on past events. Discharge-focused. Denies passive or active SI or thoughts of self harm. Denies AVH.  Mood: Describes as "good".    Affect: Bright. Full. Congruent with stated mood  Patient is alert and oriented in all spheres. Patient is cognitively grossly intact.   Insight: fair  Judgment: fair  Impulse control is intact on the unit

## 2023-08-07 NOTE — BH INPATIENT PSYCHIATRY PROGRESS NOTE - NSBHFUPINTERVALHXFT_PSY_A_CORE
Chart reviewed. Case discussed with interdisciplinary team. Patient seen and examined for follow-up of SA in the context of borderline personality disorder. No acute events over the weekend. Compliant with standing medications. Slept per sleep log at 0115.    Patient reports to be doing well today. She has been engaging well on the unit, both with therapy and peers. Therapy has been beneficial, especially in terms of allowing her to talk about her feelings. She recently got a new roommate who she gets along with well. Mood is better. Motivation is unchanged. Energy is "okay." Denies SI. Refers to SA as a "big mistake" and states that she would "contact the therapist and talk" if she were to experience SI in the future. Discharge-focused and inquiring about time of discharge.     Good appetite and PO intake. Sleeping well. No physical complaints. No side effects to medications reported.  Chart reviewed. Case discussed with interdisciplinary team. Patient seen and examined for follow-up of SA in the context of borderline personality disorder. No acute events over the weekend. Compliant with standing medications. Required acetaminophen 650 overnight for a headache. Slept per sleep log at 0115.    Patient reports to be doing well today. She has been engaging well on the unit, both with therapy and peers. Therapy has been beneficial, especially in terms of allowing her to talk about her feelings. She recently got a new roommate who she gets along with well. Mood is better. Motivation is unchanged. Energy is "okay." Denies SI. Refers to SA as a "big mistake" and states that she would "contact the therapist and talk" if she were to experience SI in the future. Discharge-focused and inquiring about time of discharge.     Good appetite and PO intake. Sleeping well. No physical complaints. No side effects to medications reported.  Chart reviewed. Case discussed with interdisciplinary team. Patient seen and examined for follow-up of SA in the context of borderline personality disorder. No acute events over the weekend. Compliant with standing medications. Required acetaminophen 650 overnight for a headache. Slept per sleep log at 0115.    Patient reports to be doing well today. She has been engaging well on the unit, both with therapy and peers. Therapy has been beneficial, especially in terms of allowing her to talk about her feelings. She recently got a new roommate who she gets along with well. Mood is better. Motivation is unchanged but improved from admission. Energy is "okay." Denies passive/active SI. Refers to SA as a "big mistake" and states that she would "contact the therapist and talk" if she were to experience SI in the future. Reports improvements in guilt and shame as compared to admission, denies anhedonia. Discharge-focused and inquiring about time of discharge.     Good appetite and PO intake. Sleeping well. No physical complaints. No side effects to medications reported.

## 2023-08-08 PROCEDURE — 90853 GROUP PSYCHOTHERAPY: CPT

## 2023-08-08 RX ORDER — SERTRALINE 25 MG/1
1 TABLET, FILM COATED ORAL
Qty: 30 | Refills: 0
Start: 2023-08-08 | End: 2023-09-06

## 2023-08-08 RX ADMIN — SERTRALINE 50 MILLIGRAM(S): 25 TABLET, FILM COATED ORAL at 20:20

## 2023-08-08 RX ADMIN — Medication 5 MILLIGRAM(S): at 23:49

## 2023-08-08 NOTE — BH INPATIENT PSYCHIATRY PROGRESS NOTE - NSTXDCOPLKDATEEST_PSY_ALL_CORE
27-Jul-2023
03-Aug-2023
27-Jul-2023
03-Aug-2023

## 2023-08-08 NOTE — BH INPATIENT PSYCHIATRY PROGRESS NOTE - NSBHFUPINTERVALCCFT_PSY_A_CORE
"I am excited to go home."
"I feel better"
"I'm doing better."
"I'm good, bored"
"I'm good."
"I'm  woke up last night with pain from my bruises where I had my IV's"
"I'm bored but good."
"I was feeling a little better last evening "
"I'm doing better."
"I'm good."
"I'm embarrassed; I can't believe I did it"

## 2023-08-08 NOTE — BH INPATIENT PSYCHIATRY PROGRESS NOTE - NSBHATTESTBILLING_PSY_A_CORE
35244-Fqtfnalrpf OBS or IP - low complexity OR 25-34 mins
36362-Fvsndgwxcs OBS or IP - low complexity OR 25-34 mins
83162-Uxphoypsyc OBS or IP - low complexity OR 25-34 mins
47482-Xphywazgpc OBS or IP - low complexity OR 25-34 mins
[id number]-Rkemhhoadn OBS or IP - low complexity OR 25-34 mins
42977-Kylfbdhnth OBS or IP - low complexity OR 25-34 mins
98353-Fisccrliyt OBS or IP - low complexity OR 25-34 mins
95861-Blawpgeqwk OBS or IP - low complexity OR 25-34 mins
68249-Ghfvxrxqfi OBS or IP - moderate complexity OR 35-49 mins
30322-Itxtlxbxgh OBS or IP - moderate complexity OR 35-49 mins
05256-Crgvjwwrmv OBS or IP - low complexity OR 25-34 mins

## 2023-08-08 NOTE — BH INPATIENT PSYCHIATRY PROGRESS NOTE - NSTXDCOPLKINTERMD_PSY_ALL_CORE
Encouragement, DBT

## 2023-08-08 NOTE — BH INPATIENT PSYCHIATRY PROGRESS NOTE - NSTXDEPRESINTERMD_PSY_ALL_CORE
Optimize medications, increased zoloft to 50mg today; support I/G/M therapy

## 2023-08-08 NOTE — BH INPATIENT PSYCHIATRY PROGRESS NOTE - NSTXNEGATDATEEST_PSY_ALL_CORE
26-Jul-2023

## 2023-08-08 NOTE — BH INPATIENT PSYCHIATRY DISCHARGE NOTE - HPI (INCLUDE ILLNESS QUALITY, SEVERITY, DURATION, TIMING, CONTEXT, MODIFYING FACTORS, ASSOCIATED SIGNS AND SYMPTOMS)
TRANSFERRED FROM [place] [facility]: 19 y/o F, single, noncaregiver, domiciled with family, [employer] student, with no past psychiatric hx, BIB EMS on 7/22/23 to the  ED for AMS noted by Pt's mother around 8PM. Pt was found on sofa with pink colored emesis and was found altered with empty bottle of benadryl which contained 600 pills and was previously half full.  Patient noted to have 3 minute generalized tonic- clonic seizure in the ED during which time she was unable to protect her airway and subsequently intubated and ativan was given. Gastric lavage was done showing pill fragments. Patient admitted to ICU, extubated on 7/24/23.  Serial EKG's were done and showed QRS and QTc remaining within normal limits. CT head negative for acute injury and EEG negative for epileptiform activity, however chest CT revealed signs of aspiration pneumonia so she was started on amoxicillin-clavulanate 875 mg-125 mg oral PO q12hrs for 5 days, started on 7/26/23. Patient seen by  Psychiatry - Pt reported that she has been feeling depressed for several weeks, started to feel very depressed since 2020, but that for the past few weeks her symptoms have been worse. Says that the combination of her parent's divorce, as well as her work and college pressures, led to her to try to kill herself. Says that she took an unknown number of pills in an effort to kill herself, felt  embarrassed about what happened and is concerned that her mother will be upset with her. Reports some feelings of hopelessness and guilt. Denied any recent family or work stressors. Also acknowledges several cuts to her left wrist done with the purpose of harming herself. She was started on Zoloft 25mg PO qd. Transferred to [facility] on a 9.27 involuntary on 7/26/23.          TRANSFERRED FROM [place] [facility]: 19 y/o F, single, noncaregiver, domiciled with family, [employer] student, with no past psychiatric hx, BIB EMS on 7/22/23 to the  ED for AMS noted by Pt's mother around 8PM. Pt was found on sofa with pink colored emesis and was found altered with empty bottle of benadryl which contained 600 pills and was previously half full.  Patient noted to have 3 minute generalized tonic- clonic seizure in the ED during which time she was unable to protect her airway and subsequently intubated and ativan was given. Gastric lavage was done showing pill fragments. Patient admitted to ICU, extubated on 7/24/23.  Serial EKG's were done and showed QRS and QTc remaining within normal limits. CT head negative for acute injury and EEG negative for epileptiform activity, however chest CT revealed signs of aspiration pneumonia so she was started on amoxicillin-clavulanate 875 mg-125 mg oral PO q12hrs for 5 days, started on 7/26/23. Patient seen by  Psychiatry - Pt reported that she has been feeling depressed for several weeks, started to feel very depressed since 2020, but that for the past few weeks her symptoms have been worse. Says that the combination of her parent's divorce, as well as her work and college pressures, led to her to try to kill herself. Says that she took an unknown number of pills in an effort to kill herself, felt  embarrassed about what happened and is concerned that her mother will be upset with her. Reports some feelings of hopelessness and guilt. Denied any recent family or work stressors. Also acknowledges several cuts to her left wrist done with the purpose of harming herself. She was started on Zoloft 25mg PO qd. Transferred to [facility] 1S on a 9.27 2PC on 7/26/23

## 2023-08-08 NOTE — BH INPATIENT PSYCHIATRY PROGRESS NOTE - NSBHCHARTREVIEWVS_PSY_A_CORE FT
Vital Signs Last 24 Hrs  T(C): 36.6 (08-08-23 @ 06:46), Max: 36.7 (08-07-23 @ 20:42)  T(F): 97.8 (08-08-23 @ 06:46), Max: 98 (08-07-23 @ 20:42)  HR: --  BP: --  BP(mean): --  RR: 16 (08-08-23 @ 06:46) (16 - 16)  SpO2: --    Orthostatic VS  08-08-23 @ 06:46  Lying BP: --/-- HR: --  Sitting BP: 101/54 HR: 80  Standing BP: --/-- HR: --  Site: --  Mode: --   Vital Signs Last 24 Hrs  T(C): 37.1 (08-08-23 @ 20:42), Max: 37.1 (08-08-23 @ 20:42)  T(F): 98.7 (08-08-23 @ 20:42), Max: 98.7 (08-08-23 @ 20:42)  HR: --  BP: --  BP(mean): --  RR: 16 (08-08-23 @ 06:46) (16 - 16)  SpO2: --    Orthostatic VS  08-08-23 @ 06:46  Lying BP: --/-- HR: --  Sitting BP: 101/54 HR: 80  Standing BP: --/-- HR: --  Site: --  Mode: --

## 2023-08-08 NOTE — BH DISCHARGE NOTE NURSING/SOCIAL WORK/PSYCH REHAB - NSBHDCAGENCY1FT_PSY_A_CORE
[facility] Intake Appointment with Dr. Gino Givens

## 2023-08-08 NOTE — BH INPATIENT PSYCHIATRY DISCHARGE NOTE - NSBHMETABOLIC_PSY_ALL_CORE_FT
BMI: BMI (kg/m2): 23.6 (07-23-23 @ 03:00)  HbA1c:   Glucose: POCT Blood Glucose.: 75 mg/dL (07-24-23 @ 06:51)    BP: 99/69 (08-07-23 @ 06:28) (99/69 - 99/69)  Lipid Panel: Date/Time: 07-23-23 @ 03:30  Cholesterol, Serum: --  Direct LDL: --  HDL Cholesterol, Serum: --  Total Cholesterol/HDL Ration Measurement: --  Triglycerides, Serum: 141

## 2023-08-08 NOTE — BH TREATMENT PLAN - NSTXSUICIDINTERPR_PSY_ALL_CORE
Patient’s psychiatric rehabilitation goal is to meet with staff individually and attend psychiatric rehabilitation groups, in order for patient to be able to state 3 reasons for living for better symptom management and sustained recovery within 7 days.
Psychiatric rehabilitation staff will continue to meet patient individually to provide support, encouragement, and counseling in order for patient to attend daily symptom management groups and identify 3 reasons for living for improved symptom management and sustained recovery over seven days.

## 2023-08-08 NOTE — BH INPATIENT PSYCHIATRY DISCHARGE NOTE - NSBHFUPINTERVALHXFT_PSY_A_CORE
Chart reviewed. Case discussed with interdisciplinary team. Patient seen and examined for day of discharge follow up of suicide ideation with suicide attempt in the context of borderline personality disorder. No acute events overnight. Compliant with standing medications. Slept per sleep log at 0015. Per sleep log, fair sleep.    Today, patient is discharge-focused and future-oriented. She is looking forward to going home and taking a proper shower, filling out FAFSA for college, and cooking dinner with her family. Hospitalization has given her the opportunity to focus on herself and her health. Additionally, she has found it helpful to have people to talk to. As a result, she feels less shameful and more open. Reports that she does not want to attempt suicide again. She would like to continue with therapy and focus on herself.     At time of interview, patient endorses good appetite, good energy, and describes her mood as "so happy." Denies hopelessness and anhedonia. Denies suicidal ideation and symptoms of depression. States that if suicidal ideation were to come up in the future, she would turn to her therapist.     Good appetite and PO intake. Sleeping well. No physical complaints. No side effects to medications reported.  Chart reviewed. Case discussed with interdisciplinary team. Patient seen and examined for day of discharge follow up of suicidal ideation with suicide attempt in the context of borderline personality disorder. No acute events overnight. Compliant with standing medications. Slept per sleep log at 0015.     Today, patient is discharge-focused and future-oriented. She is looking forward to going home and taking a proper shower, filling out FAFSA for college, and cooking dinner with her family. Hospitalization has given her the opportunity to focus on herself and her health. Additionally, she has found it helpful to have people to talk to. As a result, she feels less shameful and more open. Reports that she does not want to attempt suicide again. She would like to continue with therapy and focus on herself.     At time of interview, patient endorses good appetite, good energy, and describes her mood as "so happy." Denies hopelessness and anhedonia. Denies suicidal ideation and symptoms of depression. States that if suicidal ideation were to come up in the future, she would turn to her therapist.     Good appetite and PO intake. Sleeping well. No physical complaints. No side effects to medications reported.  Chart reviewed. Case discussed with interdisciplinary team. Patient seen and examined for day of discharge follow up of suicidal ideation with suicide attempt in the context of borderline personality disorder. No acute events overnight. Compliant with standing medications. Slept per sleep log at 0015.     Today, patient states she is looking forward to going home and taking a proper shower, filling out FAFSA for college, and cooking dinner with her family. Hospitalization has given her the opportunity to focus on herself and her health. Additionally, she has found it helpful to have people to talk to. As a result, she feels less shameful and more open. Reports that she does not want to attempt suicide again. She would like to continue with therapy and focus on herself and feels confident in her ability to reach out to her therapist if suicidal thoughts return.    Patient endorses good appetite, good energy, and describes her mood as "so happy." Denies hopelessness and anhedonia. Reports good energy, fair focus, good motivation. Denies passive or active suicidal ideation.    Good appetite and PO intake. Sleeping well. No physical complaints. No side effects to medications reported.

## 2023-08-08 NOTE — BH INPATIENT PSYCHIATRY PROGRESS NOTE - NSBHFUPINTERVALHXFT_PSY_A_CORE
Chart reviewed. Case discussed with interdisciplinary team. Patient seen and examined for f/u of SI in the context of borderline personality disorder. No acute events overnight. Compliant with standing medications. Slept per sleep log at 2300.    Today, patient is discharge-focused. She is happy and nervous about her upcoming discharge. Nervous about going back to college and figuring out details about her return. Not nervous about going home; excited to see her siblings and mom again. Endorses good energy; denies hopelessness, anhedonia, poor concentration. Denies SI. Engaging well in therapy; in particular feels it helpful to have someone to open up to, which she had not previously had at home. Finds DBT skills helpful in a group setting however not as motivated to continue them outpatient because she has to utilize the skills individually. Tries to repress thoughts of her recent SA; however reports a strong will to live.     Good appetite and PO intake. Sleeping well. No physical complaints. No side effects to medications reported.  Chart reviewed. Case discussed with interdisciplinary team. Patient seen and examined for f/u of SI in the context of borderline personality disorder. No acute events overnight. Compliant with standing medications. Required Tylenol 650 mg PO at 1900 yesterday. Slept per sleep log at 2300.    Today, patient is discharge-focused. She is happy and nervous about her upcoming discharge. Nervous about going back to college and figuring out details about her return. Not nervous about going home; excited to see her siblings and mom again. Endorses good energy; denies hopelessness, anhedonia, poor concentration. Denies SI. Engaging well in therapy; in particular feels it helpful to have someone to open up to, which she had not previously had at home. Finds DBT skills helpful in a group setting however not as motivated to continue them outpatient because she has to utilize the skills individually. Tries to repress thoughts of her recent SA; however reports a strong will to live.     Good appetite and PO intake. Sleeping well. No physical complaints. No side effects to medications reported.  Chart reviewed. Case discussed with interdisciplinary team. Patient seen and examined for f/u of SI/SA in the context of borderline personality disorder. No acute events overnight. Compliant with standing medications. Required Tylenol 650 mg PO at 1900 yesterday. Slept per sleep log at 2300.    Today, patient is discharge-focused. She is happy and nervous about her upcoming discharge. Nervous about going back to college and figuring out details about her return. Not nervous about going home; excited to see her siblings and mom again. Endorses good energy; denies hopelessness, anhedonia, poor concentration. Denies passive or active SI. Engaging well in therapy; in particular feels it helpful to have someone to open up to, which she had not previously had at home. Finds DBT skills helpful in a group setting however not as motivated to continue them outpatient because she has to utilize the skills individually. Tries to repress thoughts of her recent SA; however reports a strong will to live and is able to discuss how she will manage any return of suicidal thoughts in the future.     Good appetite and PO intake. Sleeping well. No physical complaints. No side effects to medications reported.

## 2023-08-08 NOTE — BH INPATIENT PSYCHIATRY PROGRESS NOTE - NSTXNEGATINTERMD_PSY_ALL_CORE
Encourage support I/G/M therapy

## 2023-08-08 NOTE — BH TREATMENT PLAN - NSTXCOPEDATEEST_PSY_ALL_CORE
26-Jul-2023
Initiate Treatment: Apply bacitracin or Polysporin to the areas of the bites twice daily until healed
Detail Level: Simple
Initiate Treatment: Wash with Benzaderm wash once daily
26-Jul-2023
26-Jul-2023

## 2023-08-08 NOTE — BH INPATIENT PSYCHIATRY PROGRESS NOTE - MSE UNSTRUCTURED FT
On exam today the patient is cooperative with appropriate eye contact and relatedness.  Speech is spoken with strong accent but is clear and of normal rate.    Thought process: linear, logical, goal directed  Thought content:  Discharge-focused. Denies passive or active SI or thoughts of self harm. Denies AVH.  Mood: Describes as "happy and nervous"   Affect: Bright. Full. Congruent with stated mood  Patient is alert and oriented in all spheres. Patient is cognitively grossly intact.   Insight: fair  Judgment: fair  Impulse control is intact on the unit

## 2023-08-08 NOTE — BH INPATIENT PSYCHIATRY DISCHARGE NOTE - HOSPITAL COURSE
Dates of Hospitalization at [facility] 07/26 - 08/09    On admission interview, patient presented with the following signs and symptoms: depressed mood, anhedonia, impulsivity, guilt, self-harm. On further evaluation, patient exhibited Cluster B traits, specifically identity disturbance, dysregulated affect, feelings of emptiness, suicidal ideation and attempt, prior episode of NSSIB, fear of abandonment, impulsivity, rage, and relationship instability. Patient therefore met criteria consistent with a diagnosis of Borderline Personality Disorder.     Patient was started on Zoloft 25 mg PO which was titrated to a dose of Zoloft 50 mg PO with good tolerability.      Patient’s symptoms gradually improved over the course of the hospitalization.      There were no behavioral problems on the unit.  Patient did not become agitated and did not require emergent intramuscular medications or seclusion / restraints.  Patient did not self-harm on the unit.  Patient remained actively engaged in treatment.  Patient participated in individual, group, and milieu therapy.  Patient got along appropriately with staff and peers.  Family was contacted at time of admission to obtain collateral, provide psycho-education and to collaboratively treatment plan. Family was contacted prior to discharge for safety planning and disposition planning. Patient required medical consultations for management of aspiration pneumonia, which resolved throughout the course of her hospitalization.     On day of discharge, the patient has improved significantly and no longer requires inpatient treatment and care. Patient denies all suicidal and aggressive ideation, intent and plan. Patient denies anxiety symptoms and panic attacks. Patient is not judged to be an acute danger to self or others at this time.    Risk Assessment: The patient is at a chronic risk of harm to self given a diagnosis of BPD, prior suicide attempt, history of trauma, history of impulsivity, and history of psychiatric disease requiring inpatient care. Protective factors include supportive family and friends, fear of death, engagement in work and school, responsibility to others, and identified reasons for living. On admission the patient was felt to be at an acutely elevated risk of suicide as she had been transferred from [facility] after a suicide attempt. Over the hospital course, patient partook in individual, group, and milieu therapy as well as medication management with Zoloft 50 mg PO per day. On day of discharge, patient denies passive and active suicidal ideation and has been linked to proper outpatient treatment. Additionally, she reports decreased feelings of shame, depression, and anhedonia. Given the above, the patient is no longer felt to be at an acutely elevated risk of harm to self and therefore no longer requires inpatient hospitalization. She is stable for transition to an outpatient level of care.     Patient will be discharged with the following DSM5 Diagnoses: Borderline Personality Disorder    Patient will be discharged on the following medications: Zoloft 50 mg PO once daily     Dates of Hospitalization at [facility] 07/26 - 08/09    On admission interview, patient presented with the following signs and symptoms: depressed mood, anhedonia, impulsivity, guilt, self-harm. On further evaluation, patient exhibited Cluster B traits, specifically identity disturbance, dysregulated affect, feelings of emptiness, suicidal ideation and attempt, prior episode of NSSIB, fear of abandonment, impulsivity, rage, and relationship instability. Patient therefore met criteria consistent with a diagnosis of Borderline Personality Disorder.     The patient was started on Zoloft 25 mg PO which was titrated to a dose of Zoloft 50 mg PO with good tolerability.      Patient’s symptoms gradually improved over the course of the hospitalization.     There were no behavioral problems on the unit.  Patient did not become agitated and did not require emergent intramuscular medications or seclusion / restraints.  Patient did not self-harm on the unit.  Patient remained actively engaged in treatment.  Patient participated in individual, group, and milieu therapy.  Patient got along appropriately with staff and peers.  Family was contacted at time of admission to obtain collateral, provide psycho-education and to collaboratively treatment plan. Family was contacted prior to discharge for safety planning and disposition planning. Patient required medical consultations for management of aspiration pneumonia, which resolved throughout the course of her hospitalization.     On day of discharge, the patient has improved significantly and no longer requires inpatient treatment and care. Patient denies all suicidal and aggressive ideation, intent and plan. Patient is not judged to be an acute danger to self or others at this time.         Dates of Hospitalization at [facility] 07/26 - 08/09    On admission interview, patient presented with the following signs and symptoms: depressed mood, anhedonia, impulsivity, guilt, self-harm. On further evaluation, patient exhibited Cluster B traits, specifically identity disturbance, dysregulated affect, feelings of emptiness, suicidal ideation and attempt, prior episode of NSSIB, fear of abandonment, impulsivity, rage, and relationship instability. Patient therefore met criteria consistent with a diagnosis of Borderline Personality Disorder.     The patient was started on Zoloft 25 mg PO which was titrated to a dose of Zoloft 50 mg PO with good tolerability.      Patient’s symptoms gradually improved over the course of the hospitalization.     There were no behavioral problems on the unit.  Patient did not become agitated and did not require emergent intramuscular medications or seclusion / restraints.  Patient did not self-harm on the unit.  Patient remained actively engaged in treatment.  Patient participated in individual, group, and milieu therapy.  Patient got along appropriately with staff and peers.  Family was contacted at time of admission to obtain collateral, provide psycho-education and to collaboratively treatment plan. Family was contacted prior to discharge for safety planning and disposition planning. Patient required medical consultations for management of aspiration pneumonia, which resolved throughout the course of her hospitalization.     On day of discharge, the patient has improved significantly and no longer requires inpatient treatment and care. Patient denies all suicidal and aggressive ideation, intent and plan. Patient is not judged to be an acute danger to self or others at this time.    Risk Assessment: The patient is at a chronic risk of harm to self given a diagnosis of BPD, prior suicide attempt, history of trauma, history of impulsivity, and history of psychiatric disease requiring inpatient care. Protective factors include supportive family and friends, fear of death, engagement in work and school, responsibility to others, and identified reasons for living. On admission the patient was felt to be at an acutely elevated risk of suicide as she had been transferred to [facility] from [facility] after a suicide attempt. Over the hospital course, patient partook in individual, group, and milieu therapy as well as medication management with Zoloft 50 mg PO per day. On day of discharge, patient denies passive and active suicidal ideation and has been linked to proper outpatient treatment. Additionally, she reports decreased feelings of shame and decreased symptoms of depression. Given the above, the patient is no longer felt to be at an acutely elevated risk of harm to self and therefore no longer requires inpatient hospitalization. She is stable for transition to an outpatient level of care.     Patient will be discharged with the following DSM5 Diagnoses: Borderline Personality Disorder. Depression.     Patient will be discharged on the following medications: Zoloft 50 mg PO once daily Dates of Hospitalization at [facility] 07/26 - 08/09    On admission interview, patient presented with the following signs and symptoms: depressed mood, anhedonia, impulsivity, guilt, self-harm. On further evaluation, patient endorsed identity disturbance, affective lability, feelings of emptiness, suicidal ideation and attempt, prior NSSIB, fear of abandonment, impulsivity, rage, and relationship instability. Patient therefore met criteria consistent with a diagnosis of Borderline Personality Disorder. Was not felt to meet clear criteria for a major depressive episode.    The patient was started on Zoloft 25 mg PO for depression which was titrated to a dose of Zoloft 50 mg PO with good tolerability and effect.     Patient’s symptoms gradually improved over the course of the hospitalization. Over the course patient reported and demonstrated improvement in mood, engagement and enjoyment. She reported cessation of passive and active SI with increased hope for the future. She expressed feelings of significant relief stemming from sharing about her emotions and experiences with providers and was looking forward to continuing outpatient treatment. Feelings of guilt and shame decreased, but likely remained present in the context of cultural norms and expectations. Pt also demonstrated increased knowledge of coping skills as well as ability to cope ahead and engage in safety planning.     There were no behavioral problems on the unit.  Patient did not become agitated and did not require emergent intramuscular medications or seclusion / restraints.  Patient did not self-harm on the unit.  Patient remained actively engaged in treatment.  Patient participated in individual, group, and milieu therapy.  Patient got along appropriately with staff and peers.  Team made multiple attempts to contact patient's mother but she never answered or returned calls or messages. Patient required medical consultations for management of aspiration pneumonia, which resolved throughout the course of her hospitalization.     On day of discharge, the patient has improved significantly and no longer requires inpatient treatment and care. Patient denies all suicidal and aggressive ideation, intent and plan. Patient is not judged to be an acute danger to self or others at this time.    Risk Assessment:   The patient is at a chronic risk of harm to self given a diagnosis of BPD, depressive symptoms, hospitalization, recent serious suicide attempt via OD requiring ICU admission, history of trauma, history of impulsivity, and cultural factors limiting pt's comfort speaking with family about mental health. Protective factors include supportive family and friends, fear of death, engagement in work and school, responsibility to others, identified reasons for living, lack of substance use.     On admission the patient was felt to be at an acutely elevated risk of suicide as she had been transferred to [facility] from [facility] after a serious suicide attempt via OD requiring ICU admission. Over the hospital course, patient partook in individual, group, and milieu therapy as well as medication management with Zoloft 50 mg PO per day. Treatment lead to resolution of passive and active SI, improved mood, motivation, engagement and hope, as well as increased knowledge of DBT skills and comfort in discussing emotions and experiences with mental health professionals with decreased shame. pt demonstrated appropriate behavioral control, treatment engagement and was attending to ADLs. On day of discharge, patient denies passive and active suicidal ideation, has been linked to proper outpatient treatment, and is future oriented in multiple spheres.     Given the improvements above, the patient is no longer felt to be at an acutely elevated risk of harm to self and therefore no longer requires inpatient hospitalization. She remains at chronic risk of suicide given chronic risk factors as articulated above, that cannot be further mitigated by continued inpatient psychiatric hospitalization.    Patient will be discharged with the following DSM5 Diagnoses: Borderline Personality Disorder. Unspecified Depression.     Patient will be discharged on the following medications: Zoloft 50 mg PO daily

## 2023-08-08 NOTE — BH TREATMENT PLAN - NSTXSUICIDINTERMD_PSY_ALL_CORE
Optimize medications, increased zoloft to 50mg today; support I/G/M therapy zoloft trial; support I/G/M therapy

## 2023-08-08 NOTE — BH INPATIENT PSYCHIATRY PROGRESS NOTE - NSTXSUICIDGOAL_PSY_ALL_CORE
Be able to state 3 reasons for living

## 2023-08-08 NOTE — BH TREATMENT PLAN - NSTXSUICIDINTERRN_PSY_ALL_CORE
Assess for suicidal ideation and/or self injurious behavior. Provide emotional support and maintain patient safety.

## 2023-08-08 NOTE — BH INPATIENT PSYCHIATRY PROGRESS NOTE - NSBHATTESTCOMMENTATTENDFT_PSY_A_CORE
Patient seen by me, chart reviewed, and case discussed with treatment team.  Reviewed and agree with above progress note, assessment and plan; corrections and modification made where appropriate.  The patient was out of bed more, more visible on the unit yesterday afternoon.  Encouraged to attend groups today.  She continues to minimize symptoms, but admits to continued sadness about her family situation.  She has not spoken with her mother yet, but may call today.  She denies any SI.  The patient complains of some sedation with Zoloft, will move to .
Patient see, chart reviewed, Case discussed with team. I saw the patient with the medical student, discussed the case with the student and reviewed all sections of the note, made changes where appropriate and agree with the note as written. 
Today on exam patient reports sustained improvements in depressive symptoms including hopelessness, motivation, anhedonia, mood and denies passive/active SI. She is looking forawrd to discharge, seeing family and outpatient treatment. She is unsure about her ability to use DBT skills at home w/o coaching but motivated to discuss this in treatment with outpatient therapist and able to engage in safety planning. Tolerating zoloft. Plan for d/c tomorrow if gains maintained with connection to [facility] outpatient program. 
Today on exam pt reports good mood, improved enjoyment, increased ability to express herself without guilt or shame good motivation, fair energy and denies passive/active SI. is tolerating zoloft and motivated for outpatient treatment. Work on safety planning and appropriate aftercare. Rest of plan as above. 
Patient seen by me, chart reviewed, and case discussed with treatment team.  Reviewed and agree with above progress note, assessment and plan; corrections and modification made where appropriate.  The patient presents s/p overdose in suicide attempt.  The patient expresses appropriate remorse.  However, she continues to have conflict with mother, which was precipitating factor.  The patient complains of some lightheadedness.  Noted to be orthostatic by pulse.  Encouraged the patient to take more po.  She did eat more for breakfast and lunch today.  Tolerating Zoloft well, agreeable to increase.

## 2023-08-08 NOTE — BH INPATIENT PSYCHIATRY DISCHARGE NOTE - NSSUICRSKFACTOR_PSY_ALL_CORE
Presenting Symptoms/Historical Factors/Activating Events/Stressors Current and Past Psychiatric Diagnoses/Presenting Symptoms/Historical Factors/Treatment Related Factors/Activating Events/Stressors

## 2023-08-08 NOTE — BH INPATIENT PSYCHIATRY PROGRESS NOTE - NSBHMETABOLIC_PSY_ALL_CORE_FT
BMI: BMI (kg/m2): 23.6 (07-23-23 @ 03:00)  HbA1c:   Glucose: POCT Blood Glucose.: 75 mg/dL (07-24-23 @ 06:51)    BP: 95/63 (08-04-23 @ 06:23) (94/54 - 95/63)  Lipid Panel: Date/Time: 07-23-23 @ 03:30  Cholesterol, Serum: --  Direct LDL: --  HDL Cholesterol, Serum: --  Total Cholesterol/HDL Ration Measurement: --  Triglycerides, Serum: 141  
BMI: BMI (kg/m2): 23.6 (07-23-23 @ 03:00)  HbA1c:   Glucose: POCT Blood Glucose.: 75 mg/dL (07-24-23 @ 06:51)    BP: 99/69 (08-07-23 @ 06:28) (99/55 - 99/69)  Lipid Panel: Date/Time: 07-23-23 @ 03:30  Cholesterol, Serum: --  Direct LDL: --  HDL Cholesterol, Serum: --  Total Cholesterol/HDL Ration Measurement: --  Triglycerides, Serum: 141  
BMI: BMI (kg/m2): 23.6 (07-23-23 @ 03:00)  HbA1c:   Glucose: POCT Blood Glucose.: 75 mg/dL (07-24-23 @ 06:51)    BP: 99/69 (07-30-23 @ 07:47) (99/69 - 101/59)  Lipid Panel: Date/Time: 07-23-23 @ 03:30  Cholesterol, Serum: --  Direct LDL: --  HDL Cholesterol, Serum: --  Total Cholesterol/HDL Ration Measurement: --  Triglycerides, Serum: 141  
BMI: BMI (kg/m2): 23.6 (07-23-23 @ 03:00)  HbA1c:   Glucose: POCT Blood Glucose.: 75 mg/dL (07-24-23 @ 06:51)    BP: 100/69 (07-29-23 @ 07:54) (100/69 - 101/59)  Lipid Panel: Date/Time: 07-23-23 @ 03:30  Cholesterol, Serum: --  Direct LDL: --  HDL Cholesterol, Serum: --  Total Cholesterol/HDL Ration Measurement: --  Triglycerides, Serum: 141  
BMI: BMI (kg/m2): 23.6 (07-23-23 @ 03:00)  HbA1c:   Glucose: POCT Blood Glucose.: 75 mg/dL (07-24-23 @ 06:51)    BP: --  Lipid Panel: Date/Time: 07-23-23 @ 03:30  Cholesterol, Serum: --  Direct LDL: --  HDL Cholesterol, Serum: --  Total Cholesterol/HDL Ration Measurement: --  Triglycerides, Serum: 141  
BMI: BMI (kg/m2): 23.6 (07-23-23 @ 03:00)  HbA1c:   Glucose: POCT Blood Glucose.: 75 mg/dL (07-24-23 @ 06:51)    BP: 101/59 (07-28-23 @ 07:52) (101/59 - 101/59)  Lipid Panel: Date/Time: 07-23-23 @ 03:30  Cholesterol, Serum: --  Direct LDL: --  HDL Cholesterol, Serum: --  Total Cholesterol/HDL Ration Measurement: --  Triglycerides, Serum: 141  
BMI: BMI (kg/m2): 23.6 (07-23-23 @ 03:00)  HbA1c:   Glucose: POCT Blood Glucose.: 75 mg/dL (07-24-23 @ 06:51)    BP: 105/58 (07-31-23 @ 07:48) (99/69 - 105/58)  Lipid Panel: Date/Time: 07-23-23 @ 03:30  Cholesterol, Serum: --  Direct LDL: --  HDL Cholesterol, Serum: --  Total Cholesterol/HDL Ration Measurement: --  Triglycerides, Serum: 141  
BMI: BMI (kg/m2): 23.6 (07-23-23 @ 03:00)  HbA1c:   Glucose: POCT Blood Glucose.: 75 mg/dL (07-24-23 @ 06:51)    BP: 98/59 (08-01-23 @ 09:00) (98/59 - 105/58)  Lipid Panel: Date/Time: 07-23-23 @ 03:30  Cholesterol, Serum: --  Direct LDL: --  HDL Cholesterol, Serum: --  Total Cholesterol/HDL Ration Measurement: --  Triglycerides, Serum: 141  
BMI: BMI (kg/m2): 23.6 (07-23-23 @ 03:00)  HbA1c:   Glucose: POCT Blood Glucose.: 75 mg/dL (07-24-23 @ 06:51)    BP: 99/69 (08-07-23 @ 06:28) (99/69 - 99/69)  Lipid Panel: Date/Time: 07-23-23 @ 03:30  Cholesterol, Serum: --  Direct LDL: --  HDL Cholesterol, Serum: --  Total Cholesterol/HDL Ration Measurement: --  Triglycerides, Serum: 141  
BMI: BMI (kg/m2): 23.6 (07-23-23 @ 03:00)  HbA1c:   Glucose: POCT Blood Glucose.: 75 mg/dL (07-24-23 @ 06:51)    BP: 98/59 (08-01-23 @ 09:00) (98/59 - 105/58)  Lipid Panel: Date/Time: 07-23-23 @ 03:30  Cholesterol, Serum: --  Direct LDL: --  HDL Cholesterol, Serum: --  Total Cholesterol/HDL Ration Measurement: --  Triglycerides, Serum: 141  
BMI: BMI (kg/m2): 23.6 (07-23-23 @ 03:00)  HbA1c:   Glucose: POCT Blood Glucose.: 75 mg/dL (07-24-23 @ 06:51)    BP: 94/54 (08-03-23 @ 06:30) (94/54 - 98/59)  Lipid Panel: Date/Time: 07-23-23 @ 03:30  Cholesterol, Serum: --  Direct LDL: --  HDL Cholesterol, Serum: --  Total Cholesterol/HDL Ration Measurement: --  Triglycerides, Serum: 141

## 2023-08-08 NOTE — BH INPATIENT PSYCHIATRY PROGRESS NOTE - NSBHATTESTTYPEVISIT_PSY_A_CORE
Attending Only
Attending Only
Attending with Resident/Fellow/Student

## 2023-08-08 NOTE — BH INPATIENT PSYCHIATRY PROGRESS NOTE - NSTXNEGATGOAL_PSY_ALL_CORE
Will be able to identify and utilize affirmations to create positive self-talk

## 2023-08-08 NOTE — BH INPATIENT PSYCHIATRY DISCHARGE NOTE - NSDCCPCAREPLAN_GEN_ALL_CORE_FT
PRINCIPAL DISCHARGE DIAGNOSIS  Diagnosis: Borderline personality disorder  Assessment and Plan of Treatment:       SECONDARY DISCHARGE DIAGNOSES  Diagnosis: Depression, unspecified  Assessment and Plan of Treatment:

## 2023-08-08 NOTE — BH TREATMENT PLAN - NSTXPLANTHERAPYSESSIONSFT_PSY_ALL_CORE
08-03-23  Type of therapy: Dialectical behavior therapy, Coping skills, Creative arts therapy, Leisure development, Mindfulness, Music therapy, Peer advocate  Type of session: Individual  Level of patient participation: Engaged  Duration of participation: 30 minutes  Therapy conducted by: Psych rehab  Therapy Summary: Writer met with patient for an individual session in order to review progress towards psychiatric rehabilitation goals. Patient was verbal and forthcoming during session. Patient is engaged in unit activities. Patient was not a behavioral management issue during past 7 days.     Patient has attended approximately 90 percent of psychiatric rehabilitation groups over the past seven days. Patient has demonstrated improvement in mood. Patient reports decrease in resistance to treatment. Patient reports she has been feeling better. Patient presents as less guarded, and reports feeling more in control of her emotions. Writer explored coping skills with patient. Patient reports coping skills such as mindfulness, and music. Writer encouraged patient to continue to explore, utilize, and strengthen effective and healthy coping skills. Patient was receptive. Patient reports medicaion compliance over the past 7 days. Patient has been visible in the unit and has been appropriate with peer s and staff.

## 2023-08-08 NOTE — BH DISCHARGE NOTE NURSING/SOCIAL WORK/PSYCH REHAB - NSBHDCADDR1FT_A_CORE
[id number] [facility] [address] [facility]- 1st floor

## 2023-08-08 NOTE — BH INPATIENT PSYCHIATRY PROGRESS NOTE - NSTXCOPEINTERMD_PSY_ALL_CORE
Encourage support I/G/M therapy 

## 2023-08-08 NOTE — BH INPATIENT PSYCHIATRY PROGRESS NOTE - NSBHASSESSSUMMFT_PSY_ALL_CORE
19 y/o F, single, noncaregiver, domiciled with family, [employer] student, with no past psychiatric hx, no hx NSSIB, BIB EMS on 7/22/23 to the  ED for AMS, transferred to [facility] for worsening depression and recent suicide attempt.    Working diagnosis: Borderline personality disorder    Currently, patient exhibits criteria consistent with a diagnosis of borderline personality disorder. Today, pt continues to endorse improvement in depressive symptoms with denial of hopelessness, anhedonia, poor concentration. Patient with denial of SI and thoughts of self harm with improving insight into seriousness of suicide attempt.     Patient has had improvement in engagement in DBT on the unit; however she is still with some reluctance to apply learned skills once discharged. Patient was provided with encouragement to apply skills, with pt expressing interest in trying as well as continuing to work with outpatient therapist and psychiatrist on applying skills to real life situations.      Continued inpatient admission is required for safe discharge planning.     Plan:  1. Legal: Admitted to , .[facility].   2. Safety: No reported SI/SIB/HI/VI currently on unit; continue routine observation.  3. Psychiatric: continue with Zoloft 50 mg daily. R/B/A and side effects discussed  4. Therapy: I/G/M therapy focused on DBT skills  5. Medical: No acute medical needs identified  6. Collateral from Pt's mom: [phone]. patient gave verbal consent 07/31/23--multiple outreach attempts made with no success  7. Disposition: referral sent to [facility]; plan for tomorrow 08/09   17 y/o F, single, noncaregiver, domiciled with family, [employer] student, with no past psychiatric hx, no hx NSSIB, BIB EMS on 7/22/23 to the  ED for AMS, transferred to [facility] for worsening depression and recent suicide attempt.    Working diagnosis: Borderline personality disorder    Currently, patient exhibits criteria consistent with a diagnosis of borderline personality disorder. Today, pt continues to endorse improvement in depressive symptoms with denial of hopelessness, anhedonia, poor concentration. Patient with denial of SI and thoughts of self harm with improving insight into seriousness of suicide attempt.     Patient has had improvement in engagement in DBT on the unit; however she is still with some reluctance to apply learned skills once discharged. Patient was provided with encouragement to apply skills, with pt expressing interest in continuing to work with outpatient therapist and psychiatrist on applying skills to real life situations.      Continued inpatient admission is required for safe discharge planning.     Plan:  1. Legal: Admitted to , 9.[facility].   2. Safety: No reported SI/SIB/HI/VI currently on unit; continue routine observation.  3. Psychiatric: continue with Zoloft 50 mg daily. R/B/A and side effects discussed  4. Therapy: I/G/M therapy focused on DBT skills  5. Medical: No acute medical needs identified  6. Collateral from Pt's mom: [phone]. patient gave verbal consent 07/31/23--multiple outreach attempts made with no success  7. Disposition: referral sent to [facility]; plan for tomorrow 08/09   17 y/o F, single, noncaregiver, domiciled with family, [employer] student, with no past psychiatric hx, no hx NSSIB, BIB EMS on 7/22/23 to the  ED for AMS, transferred to [facility] for worsening depression and recent suicide attempt.    Working diagnosis: Borderline personality disorder    Currently, patient exhibits criteria consistent with a diagnosis of borderline personality disorder. Today, pt continues to endorse improvement in depressive symptoms with denial of hopelessness, anhedonia, poor concentration. Patient with denial of passive/active  SI and thoughts of self harm with improving insight into seriousness of suicide attempt.    Patient has had improvement in engagement in DBT on the unit; however she is still with some reluctance to apply learned skills once discharged. Patient was provided with encouragement to apply skills, with pt expressing interest in continuing to work with outpatient therapist and psychiatrist on applying skills to real life situations.      Continued inpatient admission is required for safe discharge planning.     Plan:  1. Legal: Admitted to , 9.[facility].   2. Safety: No reported SI/SIB/HI/VI currently on unit; continue routine observation.  3. Psychiatric: continue with Zoloft 50 mg daily. R/B/A and side effects discussed  4. Therapy: I/G/M therapy focused on DBT skills  5. Medical: No acute medical needs identified  6. Collateral from Pt's mom: [phone]. patient gave verbal consent 07/31/23--multiple outreach attempts made with no success  7. Disposition: referral sent to [facility]; plan for discharge tomorrow 08/09 if gains sustained

## 2023-08-08 NOTE — BH DISCHARGE NOTE NURSING/SOCIAL WORK/PSYCH REHAB - DISCHARGE INSTRUCTIONS AFTERCARE APPOINTMENTS
In order to check the location, date, or time of your aftercare appointment, please refer to your Discharge Instructions Document given to you upon leaving the hospital.  If you have lost the instructions please call [phone]

## 2023-08-08 NOTE — BH INPATIENT PSYCHIATRY PROGRESS NOTE - NSTXDCOPLKDATETRGT_PSY_ALL_CORE
10-Aug-2023
03-Aug-2023
03-Aug-2023
10-Aug-2023
03-Aug-2023
10-Aug-2023
03-Aug-2023
03-Aug-2023
10-Aug-2023
03-Aug-2023
03-Aug-2023

## 2023-08-08 NOTE — BH TREATMENT PLAN - NSTXDCOPLKINTERSW_PSY_ALL_CORE
Writer will work with patient to connect to treatment
Writer will work patient to connect to treatment in the community
Writer will work with patient to connect to treatment

## 2023-08-08 NOTE — BH INPATIENT PSYCHIATRY PROGRESS NOTE - NSTXSUICIDDATEEST_PSY_ALL_CORE
27-Jul-2023
26-Jul-2023
27-Jul-2023

## 2023-08-08 NOTE — BH PSYCHOLOGY - GROUP THERAPY NOTE - NSPSYCHOLGRPDBTINT_PSY_A_CORE FT
taught distress tolerance skills of Pros and Cons and STOP
taught emotion regulation skill 
taught distress tolerance skill 
taught emotion regulation skill 
taught mindfulness skill of wise mind
taught distress tolerance skill 
taught distress tolerance skill 
taught emotion regulation skill

## 2023-08-08 NOTE — BH INPATIENT PSYCHIATRY DISCHARGE NOTE - NSBHDCRISKMITIGATE_PSY_ALL_CORE
Safety planning/Family/Other social support involvement/Medications targeting suicidality/non-suicidal self injurious behavior Safety planning/DBT Program/Medications targeting suicidality/non-suicidal self injurious behavior

## 2023-08-08 NOTE — BH INPATIENT PSYCHIATRY DISCHARGE NOTE - NSBHASSESSSUMMFT_PSY_ALL_CORE
19 y/o F, single, noncaregiver, domiciled with family, [employer] student, with no past psychiatric hx, no hx NSSIB, BIB EMS on 7/22/23 to the  ED for AMS, transferred to [facility] for worsening depression and recent suicide attempt.    Currently, patient exhibits criteria consistent with a diagnosis of borderline personality disorder. Today, patient continues to endorse improvement in depressive symptoms with denial of hopelessness, anhedonia, poor concentration. Patient with denial of passive/active SI and thoughts of self harm with improving insight into seriousness of suicide attempt. Patient has had improvement in engagement in DBT on the unit and has expressed interest in continuing to work with outpatient therapist and psychiatrist on applying skills to real life situations.      Risk Assessment: The patient is at a chronic risk of harm to self given a diagnosis of BPD, prior suicide attempt, history of trauma, history of impulsivity, and history of psychiatric disease requiring inpatient care. Protective factors include supportive family and friends, fear of death, engagement in work and school, responsibility to others, and identified reasons for living. On admission the patient was felt to be at an acutely elevated risk of suicide as she had been transferred to [facility] from [facility] after a suicide attempt. Over the hospital course, patient partook in individual, group, and milieu therapy as well as medication management with Zoloft 50 mg PO per day. On day of discharge, patient denies passive and active suicidal ideation and has been linked to proper outpatient treatment. Additionally, she reports decreased feelings of shame and decreased symptoms of depression. Given the above, the patient is no longer felt to be at an acutely elevated risk of harm to self and therefore no longer requires inpatient hospitalization. She is stable for transition to an outpatient level of care.     Patient will be discharged with the following DSM5 Diagnoses: Borderline Personality Disorder. Depression.     Patient will be discharged on the following medications: Zoloft 50 mg PO once daily 17 y/o F, single, noncaregiver, domiciled with family, [employer] student, with no past psychiatric hx, no hx NSSIB, BIB EMS on 7/22/23 to the  ED for AMS, transferred to [facility] for worsening depression and recent suicide attempt.    Discharge diagnosis: Borderline Personality Disorder. Depression, unspecified.     Currently, patient exhibits criteria consistent with a diagnosis of borderline personality disorder. Today, patient continues to endorse improvement in depressive symptoms with denial of hopelessness, anhedonia, poor concentration. Patient with denial of passive/active SI and thoughts of self harm with improving insight into seriousness of suicide attempt. Patient has had improvement in engagement in DBT on the unit and has expressed interest in continuing to work with outpatient therapist and psychiatrist on applying skills to real life situations.      Risk Assessment: The patient is at a chronic risk of harm to self given a diagnosis of BPD, prior suicide attempt, history of trauma, history of impulsivity, and history of psychiatric disease requiring inpatient care. Protective factors include supportive family and friends, fear of death, engagement in work and school, responsibility to others, and identified reasons for living. On admission the patient was felt to be at an acutely elevated risk of suicide as she had been transferred to [facility] from [facility] after a suicide attempt. Over the hospital course, patient partook in individual, group, and milieu therapy as well as medication management with Zoloft 50 mg PO per day. On day of discharge, patient denies passive and active suicidal ideation and has been linked to proper outpatient treatment. Additionally, she reports decreased feelings of shame and decreased symptoms of depression. Given the above, the patient is no longer felt to be at an acutely elevated risk of harm to self and therefore no longer requires inpatient hospitalization.     Patient is therefore stable for discharge and transition to outpatient level of care.     Plan:  1. Patient to be discharged to home today with sister. Patient and family in agreement with discharge plan.   2. Continue psychiatric medications as follows: Zoloft 50 mg PO daily. 30 day supply of medication provided.    3. Aftercare plan: follow-up with CP, Dr. Gino Givens  4. Medical: n/a  5. Safety plan reviewed and emergency procedures discussed including crisis clinic, ED and use of 911 for acute safety concerns. Patient and family aware of how to contact 1S team.     17 y/o F, single, noncaregiver, domiciled with family, [employer] student, with no past psychiatric hx including prior hospitalizations, outpatient tx, or SA, remote episode of NSSIB, BIB EMS on 7/22/23 to the  ED for AMS found out to be due to suicide attempt by overdose on benadryl. Pt was medically hospitalized and transferred to [facility] for treatment of depression s/o suicide attempt.    Discharge diagnosis: Borderline Personality Disorder. Depression, unspecified.     Today, patient continues to endorse improvement in depressive symptoms with good and happy mood, hope, motivation and engagement. She denies passive/active SI and thoughts of self harm with improving insight into seriousness of suicide attempt. She is sleeping, eating and attending to ADLs. Patient has had improvement in engagement in DBT on the unit and has expressed interest in continuing to work with outpatient therapist and psychiatrist on applying skills to real life situations.      Risk Assessment:   The patient is at a chronic risk of harm to self given a diagnosis of BPD, depressive symptoms, hospitalization, recent serious suicide attempt via OD requiring ICU admission, history of trauma, history of impulsivity, and cultural factors limiting pt's comfort speaking with family about mental health. Protective factors include supportive family and friends, fear of death, engagement in work and school, responsibility to others, identified reasons for living, lack of substance use.     On admission the patient was felt to be at an acutely elevated risk of suicide as she had been transferred to [facility] from [facility] after a serious suicide attempt via OD requiring ICU admission. Over the hospital course, patient partook in individual, group, and milieu therapy as well as medication management with Zoloft 50 mg PO per day. Treatment lead to resolution of passive and active SI, improved mood, motivation, engagement and hope, as well as increased knowledge of DBT skills and comfort in discussing emotions and experiences with mental health professionals with decreased shame. pt demonstrated appropriate behavioral control, treatment engagement and was attending to ADLs. On day of discharge, patient denies passive and active suicidal ideation, has been linked to proper outpatient treatment, and is future oriented in multiple spheres.     Given the improvements above, the patient is no longer felt to be at an acutely elevated risk of harm to self and therefore no longer requires inpatient hospitalization. She remains at chronic risk of suicide given chronic risk factors as articulated above, that cannot be further mitigated by continued inpatient psychiatric hospitalization.    Patient is therefore stable for discharge and transition to outpatient level of care.     Plan:  1. Patient to be discharged to home today with sister. Patient and family in agreement with discharge plan.   2. Continue psychiatric medications as follows: Zoloft 50 mg PO daily. 30 day supply of medication provided.    3. Aftercare plan: follow-up with CP, Dr. Gino Givens intake scheduled for 8/15  4. Safety plan reviewed and emergency procedures discussed including crisis clinic, ED and use of 911 for acute safety concerns. Patient aware of how to contact 1S team with questions or concerns.

## 2023-08-08 NOTE — BH INPATIENT PSYCHIATRY PROGRESS NOTE - NSTXDEPRESGOAL_PSY_ALL_CORE
Attend and participate in at least 2 groups daily despite low mood/energy

## 2023-08-08 NOTE — BH INPATIENT PSYCHIATRY DISCHARGE NOTE - NSBHDCHANDOFFFT_PSY_ALL_CORE
Handoff provided via email to Dr. Gino Givens at [facility] including information re: presentation, hospital course, and future treatment recommendations. Aware of how to reach Dr. Parrish on 1S at [phone] if questions emerge.

## 2023-08-08 NOTE — BH INPATIENT PSYCHIATRY PROGRESS NOTE - CURRENT MEDICATION
MEDICATIONS  (STANDING):  amoxicillin  875 milliGRAM(s)/clavulanate 1 Tablet(s) Oral every 12 hours  sertraline 50 milliGRAM(s) Oral at bedtime    MEDICATIONS  (PRN):  acetaminophen     Tablet .. 650 milliGRAM(s) Oral every 6 hours PRN Temp greater or equal to 38C (100.4F), Mild Pain (1 - 3)  aluminum hydroxide/magnesium hydroxide/simethicone Suspension 30 milliLiter(s) Oral every 6 hours PRN Dyspepsia  benzocaine/menthol Lozenge 1 Lozenge Oral five times a day PRN Sore Throat  Biotene Dry Mouth Oral Rinse 5 milliLiter(s) Swish and Spit three times a day PRN dry mouth  LORazepam     Tablet 0.5 milliGRAM(s) Oral three times a day PRN Anxiety  LORazepam   Injectable 2 milliGRAM(s) IntraMuscular once PRN agitation  magnesium hydroxide Suspension 30 milliLiter(s) Oral daily PRN Constipation  melatonin. 5 milliGRAM(s) Oral at bedtime PRN Insomnia  
MEDICATIONS  (STANDING):  sertraline 50 milliGRAM(s) Oral at bedtime    MEDICATIONS  (PRN):  acetaminophen     Tablet .. 650 milliGRAM(s) Oral every 6 hours PRN Temp greater or equal to 38C (100.4F), Mild Pain (1 - 3)  aluminum hydroxide/magnesium hydroxide/simethicone Suspension 30 milliLiter(s) Oral every 6 hours PRN Dyspepsia  benzocaine/menthol Lozenge 1 Lozenge Oral five times a day PRN Sore Throat  Biotene Dry Mouth Oral Rinse 5 milliLiter(s) Swish and Spit three times a day PRN dry mouth  LORazepam     Tablet 0.5 milliGRAM(s) Oral three times a day PRN Anxiety  LORazepam   Injectable 2 milliGRAM(s) IntraMuscular once PRN agitation  magnesium hydroxide Suspension 30 milliLiter(s) Oral daily PRN Constipation  melatonin. 5 milliGRAM(s) Oral at bedtime PRN Insomnia  
MEDICATIONS  (STANDING):  amoxicillin  875 milliGRAM(s)/clavulanate 1 Tablet(s) Oral every 12 hours  sertraline 50 milliGRAM(s) Oral daily    MEDICATIONS  (PRN):  acetaminophen     Tablet .. 650 milliGRAM(s) Oral every 6 hours PRN Temp greater or equal to 38C (100.4F), Mild Pain (1 - 3)  aluminum hydroxide/magnesium hydroxide/simethicone Suspension 30 milliLiter(s) Oral every 6 hours PRN Dyspepsia  benzocaine/menthol Lozenge 1 Lozenge Oral five times a day PRN Sore Throat  Biotene Dry Mouth Oral Rinse 5 milliLiter(s) Swish and Spit three times a day PRN dry mouth  LORazepam     Tablet 0.5 milliGRAM(s) Oral three times a day PRN Anxiety  LORazepam   Injectable 2 milliGRAM(s) IntraMuscular once PRN agitation  magnesium hydroxide Suspension 30 milliLiter(s) Oral daily PRN Constipation  melatonin. 5 milliGRAM(s) Oral at bedtime PRN Insomnia  
MEDICATIONS  (STANDING):  sertraline 50 milliGRAM(s) Oral at bedtime    MEDICATIONS  (PRN):  acetaminophen     Tablet .. 650 milliGRAM(s) Oral every 6 hours PRN Temp greater or equal to 38C (100.4F), Mild Pain (1 - 3)  aluminum hydroxide/magnesium hydroxide/simethicone Suspension 30 milliLiter(s) Oral every 6 hours PRN Dyspepsia  benzocaine/menthol Lozenge 1 Lozenge Oral five times a day PRN Sore Throat  Biotene Dry Mouth Oral Rinse 5 milliLiter(s) Swish and Spit three times a day PRN dry mouth  LORazepam     Tablet 0.5 milliGRAM(s) Oral three times a day PRN Anxiety  LORazepam   Injectable 2 milliGRAM(s) IntraMuscular once PRN agitation  magnesium hydroxide Suspension 30 milliLiter(s) Oral daily PRN Constipation  melatonin. 5 milliGRAM(s) Oral at bedtime PRN Insomnia  
MEDICATIONS  (STANDING):  amoxicillin  875 milliGRAM(s)/clavulanate 1 Tablet(s) Oral every 12 hours  sertraline 50 milliGRAM(s) Oral at bedtime    MEDICATIONS  (PRN):  acetaminophen     Tablet .. 650 milliGRAM(s) Oral every 6 hours PRN Temp greater or equal to 38C (100.4F), Mild Pain (1 - 3)  aluminum hydroxide/magnesium hydroxide/simethicone Suspension 30 milliLiter(s) Oral every 6 hours PRN Dyspepsia  benzocaine/menthol Lozenge 1 Lozenge Oral five times a day PRN Sore Throat  Biotene Dry Mouth Oral Rinse 5 milliLiter(s) Swish and Spit three times a day PRN dry mouth  LORazepam     Tablet 0.5 milliGRAM(s) Oral three times a day PRN Anxiety  LORazepam   Injectable 2 milliGRAM(s) IntraMuscular once PRN agitation  magnesium hydroxide Suspension 30 milliLiter(s) Oral daily PRN Constipation  melatonin. 5 milliGRAM(s) Oral at bedtime PRN Insomnia  
MEDICATIONS  (STANDING):  amoxicillin  875 milliGRAM(s)/clavulanate 1 Tablet(s) Oral every 12 hours  melatonin. 5 milliGRAM(s) Oral at bedtime    MEDICATIONS  (PRN):  acetaminophen     Tablet .. 650 milliGRAM(s) Oral every 6 hours PRN Temp greater or equal to 38C (100.4F), Mild Pain (1 - 3)  aluminum hydroxide/magnesium hydroxide/simethicone Suspension 30 milliLiter(s) Oral every 6 hours PRN Dyspepsia  benzocaine/menthol Lozenge 1 Lozenge Oral five times a day PRN Sore Throat  Biotene Dry Mouth Oral Rinse 5 milliLiter(s) Swish and Spit three times a day PRN dry mouth  LORazepam     Tablet 0.5 milliGRAM(s) Oral three times a day PRN Anxiety  LORazepam   Injectable 2 milliGRAM(s) IntraMuscular once PRN agitation  magnesium hydroxide Suspension 30 milliLiter(s) Oral daily PRN Constipation

## 2023-08-08 NOTE — BH DISCHARGE NOTE NURSING/SOCIAL WORK/PSYCH REHAB - NSCDUDCCRISIS_PSY_A_CORE
[facility]  1 [phone] [facility]-WELL ([phone])  Text "WELL" to 69464  Website: www.[employer]/.National Suicide Prevention Lifeline 0 (015) 015-9671/.  [facility]  1 (808) [facility] (413-3007)/.  [facility]  [phone]/.  [facility]  [address] [place], [place] [address] [phone]   Hours:  Monday through Friday from 9 AM to 3 PM/988 Suicide and Crisis Lifeline

## 2023-08-08 NOTE — BH INPATIENT PSYCHIATRY DISCHARGE NOTE - ATTENDING DISCHARGE PHYSICAL EXAMINATION:
see discharge progress note see discharge progress note    Agree with above documentation, contributed to by MS Miranda Armenta, with finalized document including my content, edits and additions. Patient was admitted s/p suicide attempt and responded well to zoloft and DBT with improvement in depressive symptoms, resolution of passive and active SI, increased insight, increased knowledge of and ability to use skills to manage emotions, and motivation for treatment. Pt is caring for ADLs and future oriented in multiple spheres. See detailed risk assessment above.

## 2023-08-08 NOTE — BH TREATMENT PLAN - NSBHPRIMARYDX_PSY_ALL_CORE
Borderline personality disorder    
Borderline personality disorder    
Major depressive disorder, single episode

## 2023-08-08 NOTE — BH INPATIENT PSYCHIATRY PROGRESS NOTE - NSICDXBHPRIMARYDX_PSY_ALL_CORE
Borderline personality disorder   F60.3  
Major depressive disorder, single episode   F32.9  
Borderline personality disorder   F60.3  
Borderline personality disorder   F60.3  
Major depressive disorder, single episode   F32.9  
Borderline personality disorder   F60.3  
Major depressive disorder, single episode   F32.9

## 2023-08-08 NOTE — BH INPATIENT PSYCHIATRY PROGRESS NOTE - NSTXDEPRESDATETRGT_PSY_ALL_CORE
09-Aug-2023
02-Aug-2023

## 2023-08-08 NOTE — BH INPATIENT PSYCHIATRY DISCHARGE NOTE - MSE UNSTRUCTURED FT
On exam today the patient is cooperative with appropriate eye contact and relatedness.  Speech is spoken with strong accent but is clear and of normal rate.    Thought process: linear, logical, goal directed  Thought content:  Discharge-focused. Future oriented. Denies passive or active SI or thoughts of self harm.   Mood: Describes as "happy"  Affect: Bright. Full. Congruent with stated mood  Patient is alert and oriented in all spheres. Patient is cognitively grossly intact.   Insight: fair  Judgment: fair  Impulse control is intact on the unit On exam today the patient is calm and cooperative with appropriate eye contact and relatedness. appropriate grooming and dress  Speech is spoken with strong accent but is clear and of normal rate.    Thought process: linear, logical, goal directed  Thought content:  focused on improvements, post-discharge goals, treatment planning. Denies passive or active SI or thoughts of self harm. Denies AVH, no delusional content.  Mood:  "happy"  Affect: euthymic, bright. Full. Congruent with stated mood  Patient is alert and oriented in all spheres. Patient is cognitively grossly intact.   Insight: fair  Judgment: fair  Impulse control is intact on the unit

## 2023-08-08 NOTE — BH INPATIENT PSYCHIATRY PROGRESS NOTE - PRN MEDS
MEDICATIONS  (PRN):  acetaminophen     Tablet .. 650 milliGRAM(s) Oral every 6 hours PRN Temp greater or equal to 38C (100.4F), Mild Pain (1 - 3)  aluminum hydroxide/magnesium hydroxide/simethicone Suspension 30 milliLiter(s) Oral every 6 hours PRN Dyspepsia  benzocaine/menthol Lozenge 1 Lozenge Oral five times a day PRN Sore Throat  Biotene Dry Mouth Oral Rinse 5 milliLiter(s) Swish and Spit three times a day PRN dry mouth  LORazepam     Tablet 0.5 milliGRAM(s) Oral three times a day PRN Anxiety  LORazepam   Injectable 2 milliGRAM(s) IntraMuscular once PRN agitation  magnesium hydroxide Suspension 30 milliLiter(s) Oral daily PRN Constipation  melatonin. 5 milliGRAM(s) Oral at bedtime PRN Insomnia  
MEDICATIONS  (PRN):  acetaminophen     Tablet .. 650 milliGRAM(s) Oral every 6 hours PRN Temp greater or equal to 38C (100.4F), Mild Pain (1 - 3)  aluminum hydroxide/magnesium hydroxide/simethicone Suspension 30 milliLiter(s) Oral every 6 hours PRN Dyspepsia  benzocaine/menthol Lozenge 1 Lozenge Oral five times a day PRN Sore Throat  Biotene Dry Mouth Oral Rinse 5 milliLiter(s) Swish and Spit three times a day PRN dry mouth  LORazepam     Tablet 0.5 milliGRAM(s) Oral three times a day PRN Anxiety  LORazepam   Injectable 2 milliGRAM(s) IntraMuscular once PRN agitation  magnesium hydroxide Suspension 30 milliLiter(s) Oral daily PRN Constipation  
MEDICATIONS  (PRN):  acetaminophen     Tablet .. 650 milliGRAM(s) Oral every 6 hours PRN Temp greater or equal to 38C (100.4F), Mild Pain (1 - 3)  aluminum hydroxide/magnesium hydroxide/simethicone Suspension 30 milliLiter(s) Oral every 6 hours PRN Dyspepsia  benzocaine/menthol Lozenge 1 Lozenge Oral five times a day PRN Sore Throat  Biotene Dry Mouth Oral Rinse 5 milliLiter(s) Swish and Spit three times a day PRN dry mouth  LORazepam     Tablet 0.5 milliGRAM(s) Oral three times a day PRN Anxiety  LORazepam   Injectable 2 milliGRAM(s) IntraMuscular once PRN agitation  magnesium hydroxide Suspension 30 milliLiter(s) Oral daily PRN Constipation  melatonin. 5 milliGRAM(s) Oral at bedtime PRN Insomnia  
MEDICATIONS  (PRN):  acetaminophen     Tablet .. 650 milliGRAM(s) Oral every 6 hours PRN Temp greater or equal to 38C (100.4F), Mild Pain (1 - 3)  aluminum hydroxide/magnesium hydroxide/simethicone Suspension 30 milliLiter(s) Oral every 6 hours PRN Dyspepsia  benzocaine/menthol Lozenge 1 Lozenge Oral five times a day PRN Sore Throat  Biotene Dry Mouth Oral Rinse 5 milliLiter(s) Swish and Spit three times a day PRN dry mouth  LORazepam     Tablet 0.5 milliGRAM(s) Oral three times a day PRN Anxiety  LORazepam   Injectable 2 milliGRAM(s) IntraMuscular once PRN agitation  magnesium hydroxide Suspension 30 milliLiter(s) Oral daily PRN Constipation  melatonin. 5 milliGRAM(s) Oral at bedtime PRN Insomnia

## 2023-08-09 VITALS
SYSTOLIC BLOOD PRESSURE: 140 MMHG | DIASTOLIC BLOOD PRESSURE: 58 MMHG | RESPIRATION RATE: 16 BRPM | TEMPERATURE: 97 F | HEART RATE: 70 BPM

## 2023-08-09 PROCEDURE — 90853 GROUP PSYCHOTHERAPY: CPT

## 2023-08-09 NOTE — BH PSYCHOLOGY - GROUP THERAPY NOTE - NSPSYCHOLGRPBILLING_PSY_A_CORE
[id number] - Group Psychotherapy
[id number] - Group Psychotherapy
[id number] - Group Psychotherapy
[id number] - Group Psychotherapy
[id number] - Group Psychotherapy
[id number] - Group Psychotherapy
[id number] - Group Psychotherapy
[id number] - Group Psychotherapy
[id number] - Group Psychotherapy
[id number] - Group Psychotherapy
[id number] - Group Psychotherapy

## 2023-08-09 NOTE — BH PSYCHOLOGY - GROUP THERAPY NOTE - NSPSYCHOLGRPDBTTOL_PSY_A_CORE FT
IMPROVE 
na
taught distress tolerance skills of Pros and Cons and STOP
na
IMPROVE the Moment
TIPP
Radical Acceptance 
Willingness

## 2023-08-09 NOTE — BH PSYCHOLOGY - GROUP THERAPY NOTE - NSBHPSYCHOLGRPTYPE_PSY_A_CORE
DBT Life Skills
Cognitive Behavioral Coping Skills
DBT Life Skills

## 2023-08-09 NOTE — BH SAFETY PLAN - ENVIRONMENT SAFETY 3:
Mother will hide some hair-straightening and cosmetic products that the pt has used to self-harm in the past.

## 2023-08-09 NOTE — BH PSYCHOLOGY - GROUP THERAPY NOTE - NSBHPSYCHOLPARTICIP_PSY_A_CORE
Fully engaged
Partially engaged
Partially engaged
Fully engaged

## 2023-08-09 NOTE — BH PSYCHOLOGY - GROUP THERAPY NOTE - TOKEN PULL-DIAGNOSIS
Primary Diagnosis:  Borderline personality disorder [F60.3]      Major depressive disorder, single episode [F32.9]        Problem Dx:   
Primary Diagnosis:  Major depressive disorder, single episode [F32.9]        Problem Dx:   
Primary Diagnosis:  Borderline personality disorder [F60.3]      Major depressive disorder, single episode [F32.9]        Problem Dx:   
Primary Diagnosis:  Major depressive disorder, single episode [F32.9]        Problem Dx:   
Primary Diagnosis:  Borderline personality disorder [F60.3]      Major depressive disorder, single episode [F32.9]        Problem Dx:   
Primary Diagnosis:  Major depressive disorder, single episode [F32.9]        Problem Dx:   
Primary Diagnosis:  Major depressive disorder, single episode [F32.9]        Problem Dx:   
Primary Diagnosis:  Borderline personality disorder [F60.3]      Major depressive disorder, single episode [F32.9]        Problem Dx:   
Primary Diagnosis:  Borderline personality disorder [F60.3]      Major depressive disorder, single episode [F32.9]        Problem Dx:

## 2023-08-09 NOTE — BH PSYCHOLOGY - GROUP THERAPY NOTE - NSBHPTASSESSDT_PSY_A_CORE
27-Jul-2023 11:00
03-Aug-2023 11:15
31-Jul-2023 11:15
28-Jul-2023 11:15
03-Aug-2023 15:15
04-Aug-2023 11:15
07-Aug-2023 11:15
09-Aug-2023 09:15
01-Aug-2023 11:15
02-Aug-2023 09:15
08-Aug-2023 11:15

## 2023-08-09 NOTE — BH PSYCHOLOGY - CLINICIAN PSYCHOTHERAPY NOTE - NSBHPSYCHOLGOALS_PSY_A_CORE
Decrease symptoms/Assessment/Improve social/vocational/coping skills/Psychoeducation
Decrease symptoms/Improve social/vocational/coping skills/Prevent relapse
Decrease symptoms/Assessment/Improve social/vocational/coping skills/Psychoeducation

## 2023-08-09 NOTE — BH PSYCHOLOGY - GROUP THERAPY NOTE - NSBHPSYCHOLGRPNAME_PSY_A_CORE
DBT Homework
DBT Skills
DBT Homework
DBT Skills
other...
DBT Skills
DBT (Dialectical Behavior Therapy) Group

## 2023-08-09 NOTE — BH PSYCHOLOGY - CLINICIAN PSYCHOTHERAPY NOTE - TOKEN PULL-DIAGNOSIS
Primary Diagnosis:  Borderline personality disorder [F60.3]      Major depressive disorder, single episode [F32.9]        Problem Dx:   
Primary Diagnosis:  Major depressive disorder, single episode [F32.9]        Problem Dx:   
Primary Diagnosis:  Major depressive disorder, single episode [F32.9]        Problem Dx:   
Primary Diagnosis:  Borderline personality disorder [F60.3]      Major depressive disorder, single episode [F32.9]        Problem Dx:   
Primary Diagnosis:  Major depressive disorder, single episode [F32.9]        Problem Dx:

## 2023-08-09 NOTE — BH PSYCHOLOGY - GROUP THERAPY NOTE - NSPSYCHOLGRPDBTINT_PSY_A_CORE
other...
reviewed distress tolerance, skills homework
other...
reviewed distress tolerance, skills homework
other...

## 2023-08-09 NOTE — BH PSYCHOLOGY - GROUP THERAPY NOTE - NSPSYCHOLGRPDBTGOAL_PSY_A_CORE
reduce mood and affective lability/reduce suicidal ideation/risk of attempt/improve ability to indentify feelings/improve ability to communicate feelings
reduce mood and affective lability/reduce suicidal ideation/risk of attempt/improve ability to indentify feelings/improve ability to communicate feelings/reduce vulnerability to emotional dysregualation
reduce mood and affective lability/reduce suicidal ideation/risk of attempt/improve ability to indentify feelings/reduce vulnerability to emotional dysregualation
reduce mood and affective lability/reduce suicidal ideation/risk of attempt/improve ability to indentify feelings/reduce vulnerability to emotional dysregualation
reduce mood and affective lability/reduce vulnerability to emotional dysregualation
reduce mood and affective lability/reduce suicidal ideation/risk of attempt/improve ability to indentify feelings/reduce vulnerability to emotional dysregualation
reduce mood and affective lability/reduce suicidal ideation/risk of attempt/improve ability to indentify feelings/reduce vulnerability to emotional dysregualation
reduce mood and affective lability/reduce suicidal ideation/risk of attempt/improve ability to indentify feelings/improve ability to communicate feelings/reduce vulnerability to emotional dysregualation
reduce mood and affective lability/reduce suicidal ideation/risk of attempt/improve ability to indentify feelings/reduce vulnerability to emotional dysregualation
reduce mood and affective lability/reduce suicidal ideation/risk of attempt/improve ability to indentify feelings/improve ability to communicate feelings/reduce vulnerability to emotional dysregualation

## 2023-08-09 NOTE — BH PSYCHOLOGY - CLINICIAN PSYCHOTHERAPY NOTE - NSBHPSYCHOLPROBS_PSY_ALL_CORE
Depression/Self Injurious Behavior/Suicidality

## 2023-08-09 NOTE — BH PSYCHOLOGY - GROUP THERAPY NOTE - NSBHPSYCHOLRESPONSE_PSY_A_CORE
Accepted support
Coping skills acquired/Accepted support
Accepted support

## 2023-08-09 NOTE — BH PSYCHOLOGY - GROUP THERAPY NOTE - NSBHPSYCHOLASSESSPROV_PSY_A_CORE
Licensed Psychologist and Psychology Trainee
Licensed Psychologist
Licensed Psychologist
Licensed Psychologist and Psychology Trainee
Licensed Psychologist
Licensed Psychologist and Psychology Trainee
Licensed Psychologist
Licensed Psychologist and Psychology Trainee

## 2023-08-09 NOTE — BH PSYCHOLOGY - GROUP THERAPY NOTE - NSPSYCHOLGRPDBTPT_PSY_A_CORE FT
DBT Group is a group in which patients learn skills to better manage their emotions and behaviors. Group begins with a mindfulness practice so that patients have an opportunity to practice observing their internal and external experiences.  Following the mindfulness exercise the group learns new skills in a didactic format. Group today focused on the “emotion regulation” module.  Specifically, patients learned the skill of Accumulating Positive Emotions in the Long Term by identifying values and translating those into goals and manageable action steps. Patients shared values that are important to them and how these translate into goals, as well as how they’ve begun to implement these. 
DBT skills generalization group is a group in which patients review the skill taught the day before, and patients have the opportunity to troubleshoot the skill, engage in more practice, and share their experience using the skill. Today’s skills review group focused on the skill IMPROVE the Moment, which is a distress tolerance skill that helps individuals tolerate intense emotions without acting on ineffective urges. The skill includes Imagery, finding Meaning in difficulty, Relaxation and self-Encouragement, among other components.  encouraged patients to share examples of how they tried using the skill, and leader as well as fellow participants provided helpful feedback and support.  
DBT Group is a group in which patients learn skills to better manage their emotions and behaviors. Group begins with a mindfulness practice so that patients have an opportunity to practice observing their internal and external experiences.  Following the mindfulness exercise the group learns new skills in a didactic format. Group today focused on the “emotion regulation” module.  Specifically, patients learned the skills of “PLEASE,” or taking care of the mind by taking care of the body. This skill involves maintaining consistent treatment for physical illness, balanced eating, avoidance of mood-altering substances, balanced sleep, and exercise.  provided examples and suggestions of ways to improve these areas, and patients were encouraged to engage in discussion of ways they can prioritize and implement this skill. 
DBT Group is a group in which patients learn skills to better manage their emotions and behaviors. Group begins with a mindfulness practice so that patients have an opportunity to practice observing their internal and external experiences. Following the mindfulness exercise the group learns new skills in a didactic format. Group today focused on the “distress tolerance” module. Specifically, patients learned the “IMPROVE” skill which involves using mental techniques to lower distress. These techniques include using imagery, making meaning, using prayer, focusing on one thing at a time, taking a vacation, and using encouragement. Patients practiced some of these skills in session as they were guided through a relaxation and grounding exercise. 
DBT Group is a group in which patients learn skills to better manage their emotions and behaviors. Group begins with a mindfulness practice so that patients have an opportunity to practice observing their internal and external experiences.  Following the mindfulness exercise the group learns new skills in a didactic format. Group today focused on the “distress tolerance” module.  Specifically, patients learned the skills of “STOP,” which teaches patients to pause and think before acting on emotions, and “pros and cons,” which is a way to objectively look at impulsive and destructive behaviors and see the short term and long term consequences of them. Patients were guided through an example of pros and cons provided by the writer as well as scenarios where the STOP skill is relevant, and were encouraged to complete their own pros and cons and STOP skills for homework.  
DBT skills generalization group is a group in which patients review the skill taught the day before, and patients have the opportunity to troubleshoot the skill, engage in more practice, and share their experience using the skill. Today’s skills review group focused on the skills of “radical acceptance” and "willingness" which include accepting painful situations in their lives they cannot change through turning the mind and practicing engaging in reality effectively. Patients were asked to determine difficult situations in their lives they needed to work on accepting, and provided examples of ways to practice this while in the hospital.  
DBT Group is a group in which patients learn skills to better manage their emotions and behaviors. Group begins with a mindfulness practice so that patients have an opportunity to practice observing their internal and external experiences. Following the mindfulness exercise the group learns new skills in a didactic format. Group today focused on the “distress tolerance” module, which are skills to help patients manage their crisis urges. Specifically, patients learned “TIPP” skills, which are skills to use when patients are highly distressed (at least 70/100) and are unable to think effectively to use other skills. These skills involve “Tipping” body chemistry through using temperature, intense exercise, paced breathing and progressive muscle relaxation. Each skill was reviewed and patients practiced progressive muscle relaxation and paced breathing in the session. Patients were encouraged to practice these skills while on the unit.
DBT Group is a group in which patients learn skills to better manage their emotions and behaviors. Group begins with a mindfulness practice so that patients have an opportunity to practice observing their internal and external experiences.  Following the mindfulness exercise the group learns new skills in a didactic format. Group today focused on the “distress tolerance” module, which are skills to help patients manage their crisis urges.  Specifically, pts learned the skill of “radical acceptance,” which includes accepting painful situations in their lives they cannot change through turning the mind and practicing willingness. Patients were asked to determine difficult situations in their lives they needed to work on accepting, and provided examples of ways to practice this while in the hospital. 
DBT Group is a group in which patients learn skills to better manage their emotions and behaviors. Group begins with a mindfulness practice so that patients have an opportunity to practice observing their internal and external experiences.  Following the mindfulness exercise the group learns new skills in a didactic format. Pts were taught the concept of “wise mind,” which is about identifying different states of mind (emotional vs. reasonable mind) and finding ways to tap into wise mind which is a blend of the two, and the state of mind that is consistent with wise decision making and long term goals and values.  
DBT Group is a group in which patients learn skills to better manage their emotions and behaviors. Group begins with a mindfulness practice so that patients have an opportunity to practice observing their internal and external experiences. Following the mindfulness exercise the group learns new skills in a didactic format. Group today focused on the “emotion regulation” module. Specifically, patients learned “accumulating positives,” which focused on ways to build up positive experiences in the short term to balance out negative experiences in their lives. Patients were given a pleasant activities list for ideas of positive activities they can incorporate into their everyday lives. Patients were asked to commit to pleasant activities they would engage in on the unit today to improve their moods.

## 2023-08-09 NOTE — BH PSYCHOLOGY - GROUP THERAPY NOTE - NSPSYCHOLGRPDBTPROB_PSY_A_CORE
depressed mood/suicidal ideation
depressed mood
depressed mood/suicidal ideation
depressed mood/suicidal ideation
depressed mood
depressed mood/suicidal ideation

## 2023-08-09 NOTE — BH PSYCHOLOGY - CLINICIAN PSYCHOTHERAPY NOTE - NSTXNEGATGOAL_PSY_ALL_CORE
Will be able to identify and utilize affirmations to create positive self-talk

## 2023-08-09 NOTE — BH PSYCHOLOGY - GROUP THERAPY NOTE - PROVIDER ATTESTATION
I was present with the psychology trainee during the critical/key portions of the visit. I agree with the findings and plan as documented in the psychology trainee note unless noted below.

## 2023-08-09 NOTE — BH PSYCHOLOGY - CLINICIAN PSYCHOTHERAPY NOTE - NSBHPSYCHOLINT_PSY_A_CORE
Cognitive/behavioral therapy/Dialectical  Behavioral Therapy (DBT)/Problem-solving techniques discussed
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
## 2023-08-09 NOTE — BH PSYCHOLOGY - GROUP THERAPY NOTE - NSPSYCHOLGRPDBTPT_PSY_A_CORE
stable mood and affect in group/no self-destructive impulses/behaviors/other...
stable mood and affect in group/no self-destructive impulses/behaviors/other...
stable mood and affect in group/patient showing good behavior control/other...
stable mood and affect in group/no self-destructive impulses/behaviors/other...
stable mood and affect in group/patient showing good behavior control/Patient able to identify mood states/other...
stable mood and affect in group/other...
stable mood and affect in group/no self-destructive impulses/behaviors/other...
stable mood and affect in group/patient showing good behavior control/other...
stable mood and affect in group/no self-destructive impulses/behaviors/other...
stable mood and affect in group/patient showing good behavior control/other...

## 2023-08-15 ENCOUNTER — OUTPATIENT (OUTPATIENT)
Dept: OUTPATIENT SERVICES | Facility: HOSPITAL | Age: 18
LOS: 1 days | Discharge: HOME | End: 2023-08-15
Payer: MEDICAID

## 2023-08-15 PROCEDURE — 99214 OFFICE O/P EST MOD 30 MIN: CPT

## 2023-08-16 DIAGNOSIS — F60.3 BORDERLINE PERSONALITY DISORDER: ICD-10-CM

## 2024-04-15 PROCEDURE — 99213 OFFICE O/P EST LOW 20 MIN: CPT | Mod: 95

## 2024-04-30 NOTE — PATIENT PROFILE ADULT - NSPROPTRIGHTBILLOFRIGHTS_GEN_A_NUR
What Type Of Note Output Would You Prefer (Optional)?: Standard Output How Severe Is Your Skin Lesion?: mild Has Your Skin Lesion Been Treated?: not been treated Is This A New Presentation, Or A Follow-Up?: Skin Lesion patient representative

## 2024-06-17 PROCEDURE — 99213 OFFICE O/P EST LOW 20 MIN: CPT | Mod: 95

## 2024-09-20 NOTE — BH DISCHARGE NOTE NURSING/SOCIAL WORK/PSYCH REHAB - PATIENT PORTAL LINK FT
show
You can access the FollowMyHealth Patient Portal offered by [facility] by registering at the following website: [email or web address]. By joining [facility] FollowMyHealth portal, you will also be able to view your health information using other applications (apps) compatible with our system.

## 2024-12-01 NOTE — PATIENT PROFILE ADULT - VISION (WITH CORRECTIVE LENSES IF THE PATIENT USUALLY WEARS THEM):
Normal vision: sees adequately in most situations; can see medication labels, newsprint Yes-Patient/Caregiver accepts free interpretation services...